# Patient Record
Sex: MALE | Race: WHITE
[De-identification: names, ages, dates, MRNs, and addresses within clinical notes are randomized per-mention and may not be internally consistent; named-entity substitution may affect disease eponyms.]

---

## 2019-04-04 ENCOUNTER — HOSPITAL ENCOUNTER (INPATIENT)
Dept: HOSPITAL 97 - ER | Age: 74
LOS: 1 days | Discharge: ANOTHER HOSPITAL | DRG: 282 | End: 2019-04-05
Attending: INTERNAL MEDICINE | Admitting: INTERNAL MEDICINE
Payer: COMMERCIAL

## 2019-04-04 DIAGNOSIS — E78.5: ICD-10-CM

## 2019-04-04 DIAGNOSIS — Z79.4: ICD-10-CM

## 2019-04-04 DIAGNOSIS — M19.90: ICD-10-CM

## 2019-04-04 DIAGNOSIS — E11.9: ICD-10-CM

## 2019-04-04 DIAGNOSIS — I25.10: ICD-10-CM

## 2019-04-04 DIAGNOSIS — I21.4: Primary | ICD-10-CM

## 2019-04-04 DIAGNOSIS — Z95.5: ICD-10-CM

## 2019-04-04 LAB
BUN BLD-MCNC: 35 MG/DL (ref 7–18)
GLUCOSE SERPLBLD-MCNC: 166 MG/DL (ref 74–106)
HCT VFR BLD CALC: 51.9 % (ref 39.6–49)
LYMPHOCYTES # SPEC AUTO: 0.9 K/UL (ref 0.7–4.9)
PMV BLD: 9.6 FL (ref 7.6–11.3)
POTASSIUM SERPL-SCNC: 5 MMOL/L (ref 3.5–5.1)
RBC # BLD: 5.12 M/UL (ref 4.33–5.43)
TROPONIN (EMERG DEPT USE ONLY): 0.13 NG/ML (ref 0–0.04)
UA COMPLETE W REFLEX CULTURE PNL UR: (no result)
UA DIPSTICK W REFLEX MICRO PNL UR: (no result)
URINE COARSE GRANULAR CASTS: (no result) /LPF

## 2019-04-04 PROCEDURE — 93458 L HRT ARTERY/VENTRICLE ANGIO: CPT

## 2019-04-04 PROCEDURE — 36415 COLL VENOUS BLD VENIPUNCTURE: CPT

## 2019-04-04 PROCEDURE — 85025 COMPLETE CBC W/AUTO DIFF WBC: CPT

## 2019-04-04 PROCEDURE — 81003 URINALYSIS AUTO W/O SCOPE: CPT

## 2019-04-04 PROCEDURE — 84484 ASSAY OF TROPONIN QUANT: CPT

## 2019-04-04 PROCEDURE — 71045 X-RAY EXAM CHEST 1 VIEW: CPT

## 2019-04-04 PROCEDURE — 96365 THER/PROPH/DIAG IV INF INIT: CPT

## 2019-04-04 PROCEDURE — 99284 EMERGENCY DEPT VISIT MOD MDM: CPT

## 2019-04-04 PROCEDURE — 87086 URINE CULTURE/COLONY COUNT: CPT

## 2019-04-04 PROCEDURE — 93005 ELECTROCARDIOGRAM TRACING: CPT

## 2019-04-04 PROCEDURE — 83880 ASSAY OF NATRIURETIC PEPTIDE: CPT

## 2019-04-04 PROCEDURE — 87088 URINE BACTERIA CULTURE: CPT

## 2019-04-04 PROCEDURE — 93306 TTE W/DOPPLER COMPLETE: CPT

## 2019-04-04 PROCEDURE — 80048 BASIC METABOLIC PNL TOTAL CA: CPT

## 2019-04-04 PROCEDURE — 81015 MICROSCOPIC EXAM OF URINE: CPT

## 2019-04-04 PROCEDURE — 82962 GLUCOSE BLOOD TEST: CPT

## 2019-04-04 RX ADMIN — HUMAN INSULIN SCH: 100 INJECTION, SOLUTION SUBCUTANEOUS at 20:56

## 2019-04-04 RX ADMIN — ATORVASTATIN CALCIUM SCH MG: 10 TABLET, FILM COATED ORAL at 20:29

## 2019-04-04 RX ADMIN — Medication SCH ML: at 21:28

## 2019-04-04 NOTE — ER
Nurse's Notes                                                                                     

 Formerly [facility]                                                                 

Name: Laci Larson                                                                              

Age: 73 yrs                                                                                       

Sex: Male                                                                                         

: 1945                                                                                   

MRN: [id number]                                                                                   

Arrival Date: 2019                                                                          

Time: 13:04                                                                                       

Account#: [id number]                                                                            

Bed 4                                                                                             

Private MD:                                                                                       

Diagnosis: Pulmonary edema;Hypoxemia;Unspecified combined systolic (congestive) and diastolic     

  (congestive) heart failure                                                                      

                                                                                                  

Presentation:                                                                                     

                                                                                             

13:05 Presenting complaint: EMS states: from home, pt was seen on the floor when EMS arrived; hj  

      complaining of SOB; RA O2 sat- 87%; was initially placed on BIPAP but pt refused; so        

      was placed on CPAP with A/A treatment x 1 PTA; BP- 97/48; just visited his cardiologist     

      today; A\T\O x 4 on triage;. Transition of care: patient was not received from another      

      setting of care. Onset of symptoms was 2019. Risk Assessment: Do you want to      

      hurt yourself or someone else? Patient reports no desire to harm self or others.            

      Initial Sepsis Screen: Does the patient meet any 2 criteria? Yes Does the patient have      

      a suspected source of infection? Yes:. Care prior to arrival: None.                         

13:05 Method Of Arrival: EMS: [facility]                                                        

13:05 Acuity: LADAN 2                                                                           hj  

                                                                                                  

Triage Assessment:                                                                                

13:11 General: Appears in no apparent distress. uncomfortable, Behavior is calm, cooperative, hj  

      appropriate for age. Respiratory: Reports shortness of breath labored breathing Onset:      

      The symptoms/episode began/occurred today, the patient has severe shortness of breath.      

                                                                                                  

Historical:                                                                                       

- Allergies:                                                                                      

13:10 No Known Allergies;                                                                     hj  

- Home Meds:                                                                                      

13:10 Novolin N 100 unit/mL Sub-Q susp twice a day [Active]; Plavix 75 mg Oral tab 1 tab once hj  

      daily [Active]; isosorbide mononitrate 60 mg Oral Tb24 1 tab once daily [Active];           

      metoprolol tartrate 50 mg Oral tab 25 mg 2 times per day [Active]; furosemide 40 mg         

      Oral tab 1 tab once daily [Active]; glimepiride 4 mg Oral tab 1 tab once daily              

      [Active]; aspirin 81 mg Oral chew 1 tab once daily [Active]; citalopram 20 mg tab 1 tab     

      once daily [Active]; Norco  mg Oral tab 1 tab twice a day [Active]; fentanyl 50       

      mcg/hr Topical pt72 1 patch every 72 hours [Active]; simvastatin 20 mg Oral tab 1 tab       

      once daily [Active]; lisinopril 5 mg Oral tab 1 tab once daily [Active];                    

- PMHx:                                                                                           

13:10 Diabetes - NIDDM;                                                                       hj  

13:10 Hypertension; Hyperlipidemia;                                                           hj  

- PSHx:                                                                                           

13:10 None;                                                                                   hj  

                                                                                                  

- Immunization history:: Adult Immunizations not immunized.                                       

- Social history:: Smoking status: Patient/guardian denies using tobacco,                         

  Patient/guardian denies using alcohol.                                                          

- Ebola Screening: : Patient negative for fever greater than or equal to 101.5 degrees            

  Fahrenheit, and additional compatible Ebola Virus Disease symptoms Patient denies               

  exposure to infectious person Patient denies travel to an Ebola-affected area in the            

  21 days before illness onset.                                                                   

- Family history:: not pertinent.                                                                 

- Hospitalizations: : No recent hospitalization is reported.                                      

                                                                                                  

                                                                                                  

Screenin:11 Abuse screen: Denies threats or abuse. Denies injuries from another. Nutritional        hj  

      screening: No deficits noted. Tuberculosis screening: No symptoms or risk factors           

      identified. Fall Risk Fall in past 12 months (25 points).                                   

                                                                                                  

Assessment:                                                                                       

13:11 Pain: Denies pain. Cardiovascular: Rhythm is regular. Respiratory: Airway is            hj  

      compromised Trachea midline Respiratory effort is labored, Respiratory pattern is           

      regular, tachypnea Breath sounds are diminished bilaterally.                                

13:11 General: Appears in no apparent distress. uncomfortable, Behavior is calm, cooperative, hj  

      appropriate for age. Neuro: Level of Consciousness is awake, alert, obeys commands,         

      Oriented to person, place, time, situation, Appropriate for age. GI: No signs and/or        

      symptoms were reported involving the gastrointestinal system. : No signs and/or           

      symptoms were reported regarding the genitourinary system. EENT: No signs and/or            

      symptoms were reported regarding the EENT system. Derm: No signs and/or symptoms            

      reported regarding the dermatologic system. Musculoskeletal: No signs and/or symptoms       

      reported regarding the musculoskeletal system.                                              

13:38 Reassessment: Patient and/or family updated on plan of care and expected duration. Pain hj  

      level reassessed. Patient is alert, oriented x 3, equal unlabored respirations, skin        

      warm/dry/pink. awaiting results and POC; provider in room with wife;.                       

13:48 Reassessment: Patient and/or family updated on plan of care and expected duration. Pain hj  

      level reassessed. Patient is alert, oriented x 3, equal unlabored respirations, skin        

      warm/dry/pink. Patient states feeling better. Patient states symptoms have improved.        

14:08 Reassessment: awaiting results;.                                                        hj  

14:59 Reassessment: Patient and/or family updated on plan of care and expected duration. Pain hj  

      level reassessed. Patient is alert, oriented x 3, equal unlabored respirations, skin        

      warm/dry/pink. awaiting room placement;.                                                    

                                                                                                  

Vital Signs:                                                                                      

13:12 BP 89 / 62; Pulse 74; Resp 24; Temp 97.4(O); Pulse Ox 97% on Nebulizer Mask; Weight     hj  

      120.2 kg; Height 6 ft. 3 in. (190.50 cm); Pain 0/10;                                        

13:34 BP 95 / 62; Pulse 76; Resp 20; Pulse Ox 96% on Nebulizer Mask;                          hj  

13:45  / 86; Pulse 88; Resp 18; Pulse Ox 98% on Nebulizer Mask;                         hj  

13:49 Pulse Ox 97% on 2 lpm NC;                                                               hj  

14:01 BP 79 / 89; Pulse 76; Resp 18; Pulse Ox 96% on 4 lpm NC;                                hj  

14:08 BP 90 / 57; Pulse 77; Resp 18; Pulse Ox 93% on 4 lpm NC;                                hj  

14:24  / 89; Pulse 73; Resp 18; Pulse Ox 93% on 4 lpm NC;                               hj  

14:43  / 66; Pulse 76; Resp 18; Pulse Ox 93% on 4 lpm NC;                               hj  

14:59  / 56; Pulse 76; Resp 18; Pulse Ox 94% on 4 lpm NC;                               hj  

13:12 Body Mass Index 33.12 (120.20 kg, 190.50 cm)                                            hj  

                                                                                                  

ED Course:                                                                                        

13:04 Patient arrived in ED.                                                                  ss  

13:05 Torres Gordon MD is Attending Physician.                                                rn  

13:05 Yaniv Black RN is Primary Nurse.                                                    hj  

13:07 Triage completed.                                                                       hj  

13:12 Arm band placed on right wrist.                                                         hj  

13:12 Patient has correct armband on for positive identification. Placed in gown. Bed in low  hj  

      position. Call light in reach. Side rails up X 1.                                           

13:23 Inserted saline lock: 20 gauge in left wrist, using aseptic technique.                  pc1 

13:23 EKG done, by EKG tech. reviewed by Torres Gordon MD.                                      sm3 

13:56 XRAY CXR (1 view) In Process Unspecified.                                               EDMS

14:35 Liu Cummins MD is Hospitalizing Provider.                                             rn  

                                                                                                  

Administered Medications:                                                                         

13:17 Drug: NS 0.9% 250 ml Route: IV; Rate: 1 bolus; Site: left wrist;                        hj  

14:18 Follow up: IV Status: Completed infusion                                                hj  

13:49 Drug: LaSIX 20 mg Route: PO;                                                            hj  

14:17 Follow up: Response: No adverse reaction                                                hj  

13:58 Drug: NS 0.9% 250 ml Route: IV; Rate: bolus; Site: left wrist;                          hj  

14:18 Follow up: IV Status: Completed infusion                                                hj  

14:17 Drug: NS 0.9% 250 ml Route: IV; Rate: bolus; Site: left wrist;                          hj  

14:34 Drug: Tylenol 650 mg Route: PO;                                                         hj  

14:45 Follow up: Response: No adverse reaction; Pain is decreased                             hj  

                                                                                                  

                                                                                                  

Outcome:                                                                                          

14:35 Decision to Hospitalize by Provider.                                                    rn  

15:52 Patient left the ED.                                                                    hj  

                                                                                                  

Signatures:                                                                                       

Dispatcher [employer]                           EDMS                                                 

Torres Gordon MD MD rn Smirch, Shelby, RN RN   ss                                                   

Yaniv Black RN RN                                                      

Tiffany Rocha                              sm3                                                  

Cantu, Patrick                               pc1                                                  

                                                                                                  

Corrections: (The following items were deleted from the chart)                                    

13:28 13:10 Home Meds: Unable to obtain; hj                                                   hj  

14:16 14:08 BP 90 / 57; Pulse 77bpm; Resp 18bpm; Pulse Ox 98% 4 lpm Nasal Cannula; hj         hj  

                                                                                                  

**************************************************************************************************

## 2019-04-04 NOTE — RAD REPORT
EXAM DESCRIPTION:  RAD - Chest Single View - 4/4/2019 1:55 pm

 

CLINICAL HISTORY:  Dyspnea, shortness of breath

 

COMPARISON:  March 20, 2019

 

TECHNIQUE:  AP portable chest image was obtained 1352 hours .

 

FINDINGS:  Lung volumes are low accentuating interstitial opacification. Cardiac silhouette is enlarg
ed, again accentuated by shallow inspiration. Vasculature and interstitial markings overall are promi
nent and favor a mild failure/ volume overload.

 

 No measurable pleural effusion and no pneumothorax. No acute bony abnormality seen. No acute aortic 
findings suspected.

 

IMPRESSION:  Mild CHF/volume overload pattern accentuated by body habitus, portable technique and sha
llow inspiration.

## 2019-04-04 NOTE — EDPHYS
Physician Documentation                                                                           

 [facility]                                                                 

Name: Laci Larson                                                                              

Age: 73 yrs                                                                                       

Sex: Male                                                                                         

: 1945                                                                                   

MRN: [id number]                                                                                   

Arrival Date: 2019                                                                          

Time: 13:04                                                                                       

Account#: [id number]                                                                            

Bed 4                                                                                             

Private MD:                                                                                       

ED Physician Torres Gordon                                                                         

HPI:                                                                                              

                                                                                             

13:17 This 73 yrs old  Male presents to ER via EMS with complaints of Shortness Of   rn  

      Breath.                                                                                     

13:17 The patient has shortness of breath at rest, with light activity. Onset: The            rn  

      symptoms/episode began/occurred at an unknown time. Duration: The symptoms are              

      continuous. The patient's shortness of breath is aggravated by exertion, light              

      activity, walking. Associated signs and symptoms: Pertinent positives: non-productive       

      cough, Pertinent negatives: chest pain, fever, hemoptysis, loss of consciousness.           

      Severity of symptoms: At their worst the symptoms were moderate in the emergency            

      department the symptoms have improved. The patient has experienced similar episodes in      

      the past. The patient has been recently seen by a physician:. EMS reports patient had       

      just gotten back from cardiology appt, fell to ground because sob and weakness, no LOC,     

      no injury from fall, called for lift assist, but EMS noted dyspnea and couldn't walk        

      back to house alone without rest and assistance. O2 sat was 87%, placed on CPAP after       

      couldn't tolerate BIPAP, feels a little better. .                                           

                                                                                                  

Historical:                                                                                       

- Allergies:                                                                                      

13:10 No Known Allergies;                                                                     hj  

- Home Meds:                                                                                      

13:10 Novolin N 100 unit/mL Sub-Q susp twice a day [Active]; Plavix 75 mg Oral tab 1 tab once hj  

      daily [Active]; isosorbide mononitrate 60 mg Oral Tb24 1 tab once daily [Active];           

      metoprolol tartrate 50 mg Oral tab 25 mg 2 times per day [Active]; furosemide 40 mg         

      Oral tab 1 tab once daily [Active]; glimepiride 4 mg Oral tab 1 tab once daily              

      [Active]; aspirin 81 mg Oral chew 1 tab once daily [Active]; citalopram 20 mg tab 1 tab     

      once daily [Active]; Norco  mg Oral tab 1 tab twice a day [Active]; fentanyl 50       

      mcg/hr Topical pt72 1 patch every 72 hours [Active]; simvastatin 20 mg Oral tab 1 tab       

      once daily [Active]; lisinopril 5 mg Oral tab 1 tab once daily [Active];                    

- PMHx:                                                                                           

13:10 Diabetes - NIDDM;                                                                       hj  

13:10 Hypertension; Hyperlipidemia;                                                           hj  

- PSHx:                                                                                           

13:10 None;                                                                                   hj  

                                                                                                  

- Immunization history:: Adult Immunizations not immunized.                                       

- Social history:: Smoking status: Patient/guardian denies using tobacco,                         

  Patient/guardian denies using alcohol.                                                          

- Ebola Screening: : Patient negative for fever greater than or equal to 101.5 degrees            

  Fahrenheit, and additional compatible Ebola Virus Disease symptoms Patient denies               

  exposure to infectious person Patient denies travel to an Ebola-affected area in the            

  21 days before illness onset.                                                                   

- Family history:: not pertinent.                                                                 

- Hospitalizations: : No recent hospitalization is reported.                                      

                                                                                                  

                                                                                                  

ROS:                                                                                              

13:17 Constitutional: Negative for fever, chills, and weight loss, Eyes: Negative for injury, rn  

      pain, redness, and discharge, Neck: Negative for injury, pain, and swelling,                

      Cardiovascular: Negative for chest pain, palpitations,+ mild swelling Respiratory: +        

      sob and cough Abdomen/GI: Negative for abdominal pain, nausea, vomiting, diarrhea, and      

      constipation, MS/Extremity: Negative for injury and deformity, Skin: Negative for           

      injury, rash, and discoloration, Neuro: + generalized weakness, no focal weakness or        

      paresthesias                                                                                

                                                                                                  

Exam:                                                                                             

13:14 ECG was reviewed by the Attending Physician.                                            rn  

13:17 Constitutional:  This is a well developed, well nourished patient who is awake, alert,  rn  

      on CPAP and requires assistance moving into bed from stretcher. Head/Face:                  

      Normocephalic, atraumatic. Eyes:  Pupils equal round and reactive to light,                 

      extra-ocular motions intact.  Lids and lashes normal.  Conjunctiva and sclera are           

      non-icteric and not injected.  Cornea within normal limits.  Periorbital areas with no      

      swelling, redness, or edema. ENT:  dry MM, no stridor Cardiovascular:  Regular rate and     

      rhythm.  No pulse deficits. Respiratory:  + mild tachypnea with diminished breath           

      sounds at bases. Abdomen/GI:  soft, non-tender MS/ Extremity:  Pulses equal, no             

      cyanosis.  Neurovascular intact.  Full, normal range of motion.  Equal circumference.       

      Neuro:  Awake and alert, GCS 15, oriented to person, place, time, and situation.            

      Cranial nerves II-XII grossly intact.  Motor strength 5/5 in all extremities.  Sensory      

      grossly intact.                                                                             

                                                                                                  

Vital Signs:                                                                                      

13:12 BP 89 / 62; Pulse 74; Resp 24; Temp 97.4(O); Pulse Ox 97% on Nebulizer Mask; Weight     hj  

      120.2 kg; Height 6 ft. 3 in. (190.50 cm); Pain 0/10;                                        

13:34 BP 95 / 62; Pulse 76; Resp 20; Pulse Ox 96% on Nebulizer Mask;                          hj  

13:45  / 86; Pulse 88; Resp 18; Pulse Ox 98% on Nebulizer Mask;                         hj  

13:49 Pulse Ox 97% on 2 lpm NC;                                                               hj  

14:01 BP 79 / 89; Pulse 76; Resp 18; Pulse Ox 96% on 4 lpm NC;                                hj  

14:08 BP 90 / 57; Pulse 77; Resp 18; Pulse Ox 93% on 4 lpm NC;                                hj  

14:24  / 89; Pulse 73; Resp 18; Pulse Ox 93% on 4 lpm NC;                               hj  

14:43  / 66; Pulse 76; Resp 18; Pulse Ox 93% on 4 lpm NC;                               hj  

14:59  / 56; Pulse 76; Resp 18; Pulse Ox 94% on 4 lpm NC;                               hj  

13:12 Body Mass Index 33.12 (120.20 kg, 190.50 cm)                                            hj  

                                                                                                  

MDM:                                                                                              

13:05 Patient medically screened.                                                             rn  

14:33 Differential diagnosis: Anemia CHF exacerbation, Chronic Obstructive Pulmonary Disease  rn  

      Myocardial Infarction pneumonia, pulmonary edema, reactive airway disease. Data             

      reviewed: vital signs, nurses notes, lab test result(s), EKG, radiologic studies, plain     

      films, and as a result, I will admit patient. Counseling: I had a detailed discussion       

      with the patient and/or guardian regarding: the historical points, exam findings, and       

      any diagnostic results supporting the discharge/admit diagnosis, lab results, radiology     

      results, the need for further work-up and treatment in the hospital. Response to            

      treatment: the patient's symptoms have mildly improved after treatment, and as a            

      result, I will admit patient. ED course: Pt with pulmonary edema, O2 87%, seen by Dr Arrington recently and ECHO performed but unable to walk, no oxygen at home, will admit        

      for further care. Improved BP with NS. .                                                    

                                                                                                  

                                                                                             

13:06 Order name: BMP; Complete Time: 13:46                                                   rn  

04/04                                                                                             

13:06 Order name: CBC with Diff; Complete Time: 13:46                                         rn  

                                                                                             

13:06 Order name: XRAY CXR (1 view); Complete Time: 14:27                                     rn  

                                                                                             

13:06 Order name: NT PRO-BNP; Complete Time: 13:46                                            rn  

                                                                                             

13:06 Order name: Troponin (emerg Dept Use Only); Complete Time: 13:46                        rn  

                                                                                             

13:06 Order name: EKG; Complete Time: 13:06                                                   rn  

                                                                                             

13:06 Order name: Cardiac monitoring; Complete Time: 13:10                                    rn  

                                                                                             

13:06 Order name: EKG - Nurse/Tech; Complete Time: 13:10                                      rn  

                                                                                             

13:06 Order name: IV Saline Lock; Complete Time: 13:10                                        rn  

                                                                                             

13:06 Order name: Labs collected and sent; Complete Time: 13:23                               rn  

                                                                                             

13:06 Order name: O2 Per Protocol; Complete Time: 13:10                                       rn  

                                                                                             

13:06 Order name: O2 Sat Monitoring; Complete Time: 13:10                                     rn  

                                                                                                  

EC:14 Rate is 76 beats/min. Rhythm is regular. QRS interval is normal. QT interval is         rn  

      prolonged. No Q waves. T waves are Normal. No ST changes noted. Clinical impression:        

      NSR w/ Non-specific ST/T Changes. Interpreted by me.                                        

                                                                                                  

Administered Medications:                                                                         

13:17 Drug: NS 0.9% 250 ml Route: IV; Rate: 1 bolus; Site: left wrist;                        hj  

14:18 Follow up: IV Status: Completed infusion                                                hj  

13:49 Drug: LaSIX 20 mg Route: PO;                                                            hj  

14:17 Follow up: Response: No adverse reaction                                                hj  

13:58 Drug: NS 0.9% 250 ml Route: IV; Rate: bolus; Site: left wrist;                          hj  

14:18 Follow up: IV Status: Completed infusion                                                hj  

14:17 Drug: NS 0.9% 250 ml Route: IV; Rate: bolus; Site: left wrist;                          hj  

14:34 Drug: Tylenol 650 mg Route: PO;                                                         hj  

14:45 Follow up: Response: No adverse reaction; Pain is decreased                             hj  

                                                                                                  

                                                                                                  

Disposition:                                                                                      

19 14:35 Hospitalization ordered by Liu Cummins for Inpatient Admission. Preliminary       

  diagnosis are Pulmonary edema, Hypoxemia, Unspecified combined systolic                         

  (congestive) and diastolic (congestive) heart failure.                                          

- Bed requested for Telemetry/MedSurg (Inpatient).                                                

- Status is Inpatient Admission.                                                              hj  

- Condition is Stable.                                                                            

- Problem is an ongoing problem.                                                                  

- Symptoms have improved.                                                                         

UTI on Admission? No                                                                              

                                                                                                  

                                                                                                  

                                                                                                  

Signatures:                                                                                       

Dispatcher [employer]                           EDTorres Larsen MD MD rn Joaquin, Henry, RN RN hj Botello, Elizabeth eb                                                   

                                                                                                  

Corrections: (The following items were deleted from the chart)                                    

13:28 13:10 Home Meds: Unable to obtain; hj                                                   hj  

15:02 14:35 Hospitalization Ordered by Liu Cummins MD for Inpatient Admission. Preliminary    eb  

      diagnosis is Pulmonary edema; Hypoxemia; Unspecified combined systolic (congestive) and     

      diastolic (congestive) heart failure. Bed requested for Telemetry/MedSurg (Inpatient).      

      Status is Inpatient Admission. Condition is Stable. Problem is an ongoing problem.          

      Symptoms have improved. UTI on Admission? No. rn                                            

15:52 15:02 2019 14:35 Hospitalization Ordered by Liu Cummins MD for Inpatient            

      Admission. Preliminary diagnosis is Pulmonary edema; Hypoxemia; Unspecified combined        

      systolic (congestive) and diastolic (congestive) heart failure. Bed requested for           

      Telemetry/MedSurg (Inpatient). Status is Inpatient Admission. Condition is Stable.          

      Problem is an ongoing problem. Symptoms have improved. UTI on Admission? No. eb             

                                                                                                  

**************************************************************************************************

## 2019-04-05 LAB
BUN BLD-MCNC: 35 MG/DL (ref 7–18)
GLUCOSE SERPLBLD-MCNC: 70 MG/DL (ref 74–106)
POTASSIUM SERPL-SCNC: 4.5 MMOL/L (ref 3.5–5.1)

## 2019-04-05 PROCEDURE — B211YZZ FLUOROSCOPY OF MULTIPLE CORONARY ARTERIES USING OTHER CONTRAST: ICD-10-PCS

## 2019-04-05 PROCEDURE — 4A023N7 MEASUREMENT OF CARDIAC SAMPLING AND PRESSURE, LEFT HEART, PERCUTANEOUS APPROACH: ICD-10-PCS

## 2019-04-05 RX ADMIN — HUMAN INSULIN SCH: 100 INJECTION, SOLUTION SUBCUTANEOUS at 16:30

## 2019-04-05 RX ADMIN — HUMAN INSULIN SCH: 100 INJECTION, SOLUTION SUBCUTANEOUS at 20:56

## 2019-04-05 RX ADMIN — ATORVASTATIN CALCIUM SCH MG: 10 TABLET, FILM COATED ORAL at 20:55

## 2019-04-05 RX ADMIN — HUMAN INSULIN SCH: 100 INJECTION, SOLUTION SUBCUTANEOUS at 11:30

## 2019-04-05 RX ADMIN — Medication SCH ML: at 09:12

## 2019-04-05 RX ADMIN — Medication SCH ML: at 20:56

## 2019-04-05 RX ADMIN — HUMAN INSULIN SCH: 100 INJECTION, SOLUTION SUBCUTANEOUS at 07:30

## 2019-04-05 NOTE — CON
History Of Present Illness:  Mr. Larson is 73, he came to the hospital because he was trying to get
 himself into a pickup truck, struggled, got weak and fell to the ground.  EMS was called.  He was br
ought here.  Denies having any chest pain, but his x-ray showed possible pulmonary edema, although it
 noted there is a possibility that it is under-penetration, poor inspiratory effort and a portable te
chnique that made it look like that Mr. Larson has a history of CAD.  He had a cardiac cath and zachary
nt in 2006.  Since then, nobody has looked at his heart.  He has diabetes, obesity, renal insufficien
cy, hypertension, dyslipidemia.  Outpatient, he takes simvastatin, lisinopril, Lasix, Plavix, aspirin
, fentanyl, insulin, hydrocodone, citalopram, glimepiride, metoprolol, isosorbide mononitrate.  I saw
 him 2 days ago in my office and stopped one of his medicines Cardizem because of symptoms that sound
 like heart failure and symptoms of dizziness with demonstrated hypotension.  His blood pressure has 
been better since we stopped cardia, so I continue to think it was a correct thing to do to stop card
ia.  We need to do an echo, need to do a cardiac cath.  He will be at risk of developing worsening re
nal function after the cardiac cath.  His creatinine is 1.98.  It is in this situation where unstable
 heart disease could be a more serious problem very quickly if we do not do everything that is possib
le to improve that.  So, I recommend a cardiac cath, possible stent.  We will do an echocardiogram to
 minimize the amount of contrast as much as possible.  The patient seems to understand the procedure,
 potential benefits, indications, risks, and agrees to proceed.



Physical Examination:

Lungs: His lungs are clear. 

Heart:  Reveals a regular rate and rhythm.  

Vital Signs:  Blood pressure is 108/55, heart rate 86, temperature 98.1.  



I would recommend that we do a cardiac cath later today.





JAYCEE/NVADEEP

DD:  04/05/2019 07:42:33Voice ID:  [id number]

DT:  04/05/2019 12:18:55Report ID:  [id number]

## 2019-04-05 NOTE — ECHO
HEIGHT: 6 ft 3 in   WEIGHT: 265 lb 0 oz   DATE OF STUDY: 04/05/2019   REFER DR: Mohinder Arrington MD

2-DIMENSIONAL: YES

     M.MODE: YES

 DOPPLER: YES

COLOR FLOW: YES



                    TDS:  NO

PORTABLE: NO

 DEFINITY:  NO

BUBBLE STUDY: NO





DIAGNOSIS:  EVALUATE LEFT VENTRICULAR EJECTION FRACTION



CARDIAC HISTORY:  

CATHERIZATION: YES

SURGERY: NO

PROSTHETIC VALVE: NO

PACEMAKER: NO





MEASUREMENTS (cm)

    DIASTOLIC (NORMALS)                 SYSTOLIC (NORMALS)

IVSd                 1.3 (0.6-1.2)                    LA Diam       (1.9-4.0)     LVEF     
    60-69%  

LVIDd               3.9 (3.5-5.7)                        LVIDs      3.0 (2.0-3.5)     %FS  
        23%

LVPWd             1.3 (0.6-1.2)

Ao Diam           3.4 (2.0-3.7)



2 DIMENSIONAL ASSESSMENT:

RIGHT ATRIUM:                   DILATED

LEFT ATRIUM:       DILATED



RIGHT VENTRICLE:            DILATED

LEFT VENTRICLE: LEFT VENTRICULAR HYPERTROPHY



TRICUSPID VALVE:             NORMAL

MITRAL VALVE:     NORMAL



PULMONIC VALVE:             NORMAL

AORTIC VALVE:     TRILEAFLET, MILD THICKENING



PERICARDIAL EFFUSION: NONE

AORTIC ROOT:      NORMAL





LEFT VENTRICULAR WALL MOTION:     NORMAL



DOPPLER/COLOR FLOW:     MILD TRICUSPID REGURGITATION. MILD PULMONARY HYPERTENSION. 
ESTIMATED RIGHT VENTRICULAR SYSTOLIC PRESSURE 43 mmHg. 



COMMENTS:      NORMAL LEFT VENTRICULAR EJECTION FRACTION. LEFT VENTRICULAR HYPERTROPHY. 
DILATED LEFT ATRIUM, RIGHT ATRIUM AND RIGHT VENTRICLE. AORTIC SCLEROSIS WITH NO AORTIC 
STENOSIS OR AORTIC REGURGITATION. 



TECHNOLOGIST:   MONO GIRALDO

## 2019-04-05 NOTE — OP
Surgeon:  Mohinder Arrington MD



Procedures:  Left heart catheterization, coronary angiography.  No LV gram was done.



Findings:  The patient has severe 3-vessel CAD and needs bypass surgery.  His right coronary artery h
as a 70% ostial stenosis in the midportion, sequential 90% stenosis.  There is an old stent in the re
gion and the ostium of the posterior descending artery has a 90% stenosis.  The left main is very irr
egular, free of any significant stenosis.  The circumflex is large, ectatic, irregular, but no signif
icant stenosis.  The ostium of the LAD has a 70% stenosis.  The mid LAD has a 70% stenosis.  The osti
um of the ramus intermedius artery, a very large artery, has an ostial 70% stenosis.  Left ventriculo
gram was not done, but we measured pressures and found the left ventricular end-diastolic pressure at
 0, no pullback gradient, normal pressures.



Procedure In Detail:  The patient was brought to the cardiac cath lab in a fasting state.  He had dedra
dence of a non-ST elevation MI associated with a fall.  Prepared and draped in usual sterile fashion,
 sedated with Versed and fentanyl.  Right radial approach was used.  Right radial artery was identifi
ed through palpation, anesthetized with 1% lidocaine, entered with a 21-gauge needle, cannulated with
 a 0.021 inch diameter guidewire.  A 6-Danish Terumo sheath was used.  As soon as the sheath was in p
lace, it was flushed and we gave the radial cocktail consisting of nicardipine, heparin, nitroglyceri
n.  The TIG catheter was guided into the ascending aorta and allowed us to measure LV pressures, estelle
ogram of the right coronary artery and left coronary artery.  At the end of the procedure, the cathet
er was removed, sheath was removed, and the arteriotomy was closed with a TR band.



Estimated Blood Loss:  5 cc.



Complications:  None.



First Assistant:  Kojo Hui.





PROSPER

DD:  04/05/2019 10:21:23Voice ID:  [id number]

DT:  04/05/2019 22:41:22Report ID:  [id number]

## 2019-04-06 NOTE — HP
Date of Admission:  04/04/2019



Chief Complaint:  Chest pain, shortness of breath.



History Of Present Illness:  A 73-year-old male was brought to the emergency room because of shortnes
s of breath and chest pain.  The patient's evaluation showed evidence of heart failure as well as triny
vated troponin.  The patient is admitted.  The patient denied any history of fever, chills, rigors.



Past Medical History:  Extensive, includes history of type 2 diabetes requiring insulin, hypertension
, history of osteoarthritis.



Surgical History:  Negative.



Home Medicines:  Please refer to the chart.



Review of Systems:

The patient denied any history of fever, chills, rigors.



Physical Examination:

General:  Revealed a 73-year-old male, not in acute distress. 

HEENT:  Negative. 

Neck:  Supple.  JVD positive. 

Chest:  Scattered wheezes bilaterally. 

Heart:  Irregularity noted. 

Abdomen:  Soft. 

Extremities:  Mild pedal edema.



Laboratory Data:  Troponin over 3.  White count, hemoglobin normal.  Chem profile; BUN 35, creatinine
 1.9.  Chest x-ray, pulmonary congestion.



Assessment:  

1.Nontransmural MI.

2.Congestive heart failure.

3.Type 2 diabetes.

4.Chronic renal failure.

5.Osteoarthritis.



Plan:  The patient is scheduled for cardiac cath today to see his coronary anatomy.  There is Cardiol
ogy consult.  Meanwhile, the patient will be put on insulin sliding scale.  His blood pressure is low
.  Some of his medications will be on hold.





MICHAEL/NAVDEEP

DD:  04/05/2019 12:33:44Voice ID:  [id number]

## 2019-04-09 NOTE — EKG
Test Date:    2019-04-04               Test Time:    13:06:48

Technician:   NATE                                     

                                                     

MEASUREMENT RESULTS:                                       

Intervals:                                           

Rate:         76                                     

DE:           206                                    

QRSD:         100                                    

QT:           460                                    

QTc:          517                                    

Axis:                                                

P:            79                                     

DE:           206                                    

QRS:          126                                    

T:            140                                    

                                                     

INTERPRETIVE STATEMENTS:                                       

                                                     

Normal sinus rhythm

Cannot rule out Anterior infarct, age undetermined

Prolonged QT

Abnormal ECG

Compared to ECG 02/18/2007 06:12:20

Prolonged QT interval now present

Sinus tachycardia no longer present

Ventricular premature complex(es) no longer present

Atrial premature complex(es) no longer present

Myocardial infarct finding still present



Electronically Signed On 04-04-19 16:26:11 CDT by Mohinder Arrington

## 2019-07-14 ENCOUNTER — HOSPITAL ENCOUNTER (INPATIENT)
Dept: HOSPITAL 97 - ER | Age: 74
LOS: 2 days | Discharge: HOME | DRG: 193 | End: 2019-07-16
Attending: INTERNAL MEDICINE | Admitting: INTERNAL MEDICINE
Payer: COMMERCIAL

## 2019-07-14 VITALS — BODY MASS INDEX: 32.3 KG/M2

## 2019-07-14 DIAGNOSIS — I25.2: ICD-10-CM

## 2019-07-14 DIAGNOSIS — Z79.82: ICD-10-CM

## 2019-07-14 DIAGNOSIS — F03.90: ICD-10-CM

## 2019-07-14 DIAGNOSIS — E11.42: ICD-10-CM

## 2019-07-14 DIAGNOSIS — Z79.4: ICD-10-CM

## 2019-07-14 DIAGNOSIS — K59.00: ICD-10-CM

## 2019-07-14 DIAGNOSIS — Z99.81: ICD-10-CM

## 2019-07-14 DIAGNOSIS — E78.5: ICD-10-CM

## 2019-07-14 DIAGNOSIS — G89.29: ICD-10-CM

## 2019-07-14 DIAGNOSIS — I11.0: ICD-10-CM

## 2019-07-14 DIAGNOSIS — I25.10: ICD-10-CM

## 2019-07-14 DIAGNOSIS — I71.4: ICD-10-CM

## 2019-07-14 DIAGNOSIS — M54.9: ICD-10-CM

## 2019-07-14 DIAGNOSIS — I50.23: ICD-10-CM

## 2019-07-14 DIAGNOSIS — J18.9: Primary | ICD-10-CM

## 2019-07-14 LAB
ALBUMIN SERPL BCP-MCNC: 3.1 G/DL (ref 3.4–5)
ALP SERPL-CCNC: 122 U/L (ref 45–117)
ALT SERPL W P-5'-P-CCNC: 25 U/L (ref 12–78)
AST SERPL W P-5'-P-CCNC: 20 U/L (ref 15–37)
BUN BLD-MCNC: 28 MG/DL (ref 7–18)
GLUCOSE SERPLBLD-MCNC: 221 MG/DL (ref 74–106)
HCT VFR BLD CALC: 48.9 % (ref 39.6–49)
INR BLD: 1.1
LIPASE SERPL-CCNC: 101 U/L (ref 73–393)
LYMPHOCYTES # SPEC AUTO: 1.4 K/UL (ref 0.7–4.9)
NT-PROBNP SERPL-MCNC: 5757 PG/ML (ref ?–450)
PMV BLD: 10 FL (ref 7.6–11.3)
POTASSIUM SERPL-SCNC: 4.6 MMOL/L (ref 3.5–5.1)
RBC # BLD: 4.93 M/UL (ref 4.33–5.43)
TROPONIN (EMERG DEPT USE ONLY): < 0.02 NG/ML (ref 0–0.04)

## 2019-07-14 PROCEDURE — 94760 N-INVAS EAR/PLS OXIMETRY 1: CPT

## 2019-07-14 PROCEDURE — 74176 CT ABD & PELVIS W/O CONTRAST: CPT

## 2019-07-14 PROCEDURE — 83690 ASSAY OF LIPASE: CPT

## 2019-07-14 PROCEDURE — 82962 GLUCOSE BLOOD TEST: CPT

## 2019-07-14 PROCEDURE — 93005 ELECTROCARDIOGRAM TRACING: CPT

## 2019-07-14 PROCEDURE — 80061 LIPID PANEL: CPT

## 2019-07-14 PROCEDURE — 71045 X-RAY EXAM CHEST 1 VIEW: CPT

## 2019-07-14 PROCEDURE — 82805 BLOOD GASES W/O2 SATURATION: CPT

## 2019-07-14 PROCEDURE — 87040 BLOOD CULTURE FOR BACTERIA: CPT

## 2019-07-14 PROCEDURE — 71046 X-RAY EXAM CHEST 2 VIEWS: CPT

## 2019-07-14 PROCEDURE — 99285 EMERGENCY DEPT VISIT HI MDM: CPT

## 2019-07-14 PROCEDURE — 80076 HEPATIC FUNCTION PANEL: CPT

## 2019-07-14 PROCEDURE — 80048 BASIC METABOLIC PNL TOTAL CA: CPT

## 2019-07-14 PROCEDURE — 85610 PROTHROMBIN TIME: CPT

## 2019-07-14 PROCEDURE — 85025 COMPLETE CBC W/AUTO DIFF WBC: CPT

## 2019-07-14 PROCEDURE — 93306 TTE W/DOPPLER COMPLETE: CPT

## 2019-07-14 PROCEDURE — 84484 ASSAY OF TROPONIN QUANT: CPT

## 2019-07-14 PROCEDURE — 36415 COLL VENOUS BLD VENIPUNCTURE: CPT

## 2019-07-14 PROCEDURE — 81003 URINALYSIS AUTO W/O SCOPE: CPT

## 2019-07-14 PROCEDURE — 83880 ASSAY OF NATRIURETIC PEPTIDE: CPT

## 2019-07-14 NOTE — EKG
Test Date:    2019-07-14               Test Time:    18:24:40

Technician:   TEVIN                                    

                                                     

MEASUREMENT RESULTS:                                       

Intervals:                                           

Rate:         115                                    

SD:           178                                    

QRSD:         98                                     

QT:           330                                    

QTc:          456                                    

Axis:                                                

P:            38                                     

SD:           178                                    

QRS:          120                                    

T:            -73                                    

                                                     

INTERPRETIVE STATEMENTS:                                       

                                                     

Sinus tachycardia

Possible Left atrial enlargement

Right axis deviation

ST & T wave abnormality, consider inferior ischemia

ST & T wave abnormality, consider anterolateral ischemia

Abnormal ECG

Compared to ECG 04/04/2019 13:06:48

Left posterior fascicular block now present

Sinus rhythm no longer present

Myocardial infarct finding no longer present

Prolonged QT interval no longer present



Electronically Signed On 07-14-19 20:07:58 CDT by Mohinder Arrington

## 2019-07-14 NOTE — ER
Nurse's Notes                                                                                     

 [facility]                                                                 

Name: Laci Larson                                                                              

Age: 74 yrs                                                                                       

Sex: Male                                                                                         

: 1945                                                                                   

MRN: [id number]                                                                                   

Arrival Date: 2019                                                                          

Time: 18:26                                                                                       

Account#: [id number]                                                                            

Bed 6                                                                                             

Private MD:                                                                                       

Diagnosis: Pneumonia                                                                              

                                                                                                  

Presentation:                                                                                     

                                                                                             

18:27 Presenting complaint: EMS states: abd pain and chest pain X 2 days, also c/o            iw  

      constipation, states he just doesn't feel good , rates pain 7/10. Transition of care:       

      patient was not received from another setting of care. Onset of symptoms was 2019. Risk Assessment: Do you want to hurt yourself or someone else? Patient reports no     

      desire to harm self or others. Initial Sepsis Screen: Does the patient meet any 2           

      criteria? HR > 90 bpm. Does the patient have a suspected source of infection? No.           

      Patient's initial sepsis screen is negative. Care prior to arrival: Medication(s)           

      given: ASA, 81 mg, x 3, Nitroglycerin, IV initiated. 20 GA, in the left wrist, Oxygen       

      administered. via nasal cannula.                                                            

18:27 Method Of Arrival: EMS                                                                  iw  

18:27 Method Of Arrival: EMS: [facility]                                                      iw  

18:27 Acuity: LADAN 2                                                                           iw  

                                                                                                  

Historical:                                                                                       

- Allergies:                                                                                      

18:39 No Known Allergies;                                                                     iw  

- Home Meds:                                                                                      

18:30 aspirin 81 mg Oral chew 1 tab once daily [Active]; citalopram 20 mg tab 1 tab once      iw  

      daily [Active]; fentanyl 50 mcg/hr Topical pt72 1 patch every 72 hours [Active];            

      glimepiride 4 mg Oral tab 1 tab once daily [Active]; isosorbide mononitrate 60 mg Oral      

      Tb24 1 tab once daily [Active]; metoprolol tartrate 50 mg Oral tab 25 mg 2 times per        

      day [Active]; Norco  mg Oral tab 1 tab twice a day [Active]; Plavix 75 mg Oral        

      tab 1 tab once daily [Active];                                                              

18:39 torsemide 20 mg oral tab 1 tab once daily [Active]; atorvastatin 80 mg oral tab 1 tab   iw  

      once daily [Active]; Novolin R Sub-Q 30 unit daily [Active];                                

- PMHx:                                                                                           

18:30 Diabetes - NIDDM; Hyperlipidemia; Hypertension;                                         iw  

- PSHx:                                                                                           

18:30 None;                                                                                   iw  

                                                                                                  

- Immunization history:: Adult Immunizations not up to date.                                      

- Social history:: Smoking status: Patient/guardian denies using tobacco.                         

- Ebola Screening: : Patient negative for fever greater than or equal to 101.5 degrees            

  Fahrenheit, and additional compatible Ebola Virus Disease symptoms Patient denies               

  exposure to infectious person Patient denies travel to an Ebola-affected area in the            

  21 days before illness onset No symptoms or risks identified at this time.                      

- Family history:: not pertinent.                                                                 

- Hospitalizations: : No recent hospitalization is reported.                                      

                                                                                                  

                                                                                                  

Screenin:34 Abuse screen: Denies threats or abuse. Denies injuries from another. Nutritional        iw  

      screening: No deficits noted. Tuberculosis screening: No symptoms or risk factors           

      identified. Fall Risk IV access (20 points).                                                

                                                                                                  

Assessment:                                                                                       

18:30 General: Appears in no apparent distress. uncomfortable, obese, well developed,         sv  

      Behavior is calm, cooperative, appropriate for age. Pain: Complains of pain in chest        

      and abdomen Pain currently is 7 out of 10 on a pain scale. Pain began 2-3 days ago. Is      

      intermittent. Neuro: Level of Consciousness is awake, alert, obeys commands, Oriented       

      to person, place, time, situation, Moves all extremities. Speech is normal.                 

      Cardiovascular: Patient's skin is warm and dry. Pulses are 3+ in right radial artery        

      and left radial artery. Respiratory: Reports shortness of breath Airway is patent           

      Respiratory effort is even, unlabored, Respiratory pattern is symmetrical, tachypnea.       

      GI: Abdomen is round obese, Abd is soft and non tender X 4 quads. Reports constipation.     

      Derm: Skin is thin, with poor turgor Skin is normal. Musculoskeletal: Range of motion:      

      intact in all extremities.                                                                  

19:45 Reassessment: Patient request to sit up in chair; Uncomfortable with being in stretcher lp1 

      and requesting to have venturi mask removed; Patient transferred to wheelchair and          

      nasal canula at 3L for comfort.                                                             

                                                                                                  

Vital Signs:                                                                                      

18:29  / 68; Pulse 105; Resp 20; Temp 99.1(TE); Pulse Ox 82% on R/A; Weight 120.2 kg;   iw  

      Height 6 ft. 3 in. (190.50 cm); Pain 7/10;                                                  

18:30 Pulse Ox 82% on R/A;                                                                    sv  

19:45  / 64; Pulse 102; Resp 16; Pulse Ox 94% on 3 lpm NC;                              lp1 

20:30  / 91; Pulse 102; Resp 20; Pulse Ox 94% on 3 lpm NC;                              lp1 

21:46  / 62; Pulse 109; Resp 17 S; Temp 98.3(O); Pulse Ox 93% on 3 lpm NC;              jd3 

18:29 Body Mass Index 33.12 (120.20 kg, 190.50 cm)                                            iw  

18:30 Pt placed on O2 \T\ 3L per NC, O2 sat up to 88%. Pt placed on 50% Venturi mask, O2 sat up sv  

      to 95%.                                                                                     

                                                                                                  

ED Course:                                                                                        

18:26 Patient arrived in ED.                                                                  iw  

18:27 Torres Gordon MD is Attending Physician.                                                rn  

18:29 Triage completed.                                                                       iw  

18:30 Patient has correct armband on for positive identification. Bed in low position. Call   sv  

      light in reach. Side rails up X2. Cardiac monitor on. Pulse ox on. NIBP on. Door            

      closed. Head of bed elevated.                                                               

18:30 Initial lab(s) drawn, by me, sent to lab. First set of blood cultures drawn by me.      sv  

      Inserted saline lock: 20 gauge in right upper arm, using aseptic technique. Blood           

      collected. Flushed right with 5 ml normal saline.                                           

18:30 Maintain EMS IV. Dressing intact. Site clean \T\ dry. Gauge \T\ site: 20G Left FA.          sv

18:34 Arm band placed on.                                                                     iw  

18:45 Second set of blood cultures drawn by me.                                               sv  

18:55 CT completed. Patient tolerated procedure well. Patient moved back from CT.             mw3 

18:56 Dior Zhong RN is Primary Nurse.                                                  sv  

18:56 Basic Metabolic Panel Sent.                                                             sv  

18:57 CT Abd/Pelvis - Without Contrast In Process Unspecified.                                EDMS

19:01 Attending Physician role handed off by Torres Gordon MD                                 ps1 

19:01 Chester Love MD is Attending Physician.                                             ps1 

19:01 Patient moved back from CT.                                                             sv  

19:08 Report given to Adrian THAYER and Fabi THAYER.                                               sv  

19:15 Primary Nurse role handed off by Dior Zhong RN                                   sv  

19:35 XRAY Chest (1 view): cough In Process Unspecified.                                      EDMS

20:20 Liu Cummins MD is Hospitalizing Provider.                                             ps1 

20:28 Trotter, Fabi, RN is Primary Nurse.                                                       lp1 

20:30 No provider procedures requiring assistance completed. Patient admitted, IV remains in  lp1 

      place.                                                                                      

                                                                                                  

Administered Medications:                                                                         

21:04 Drug: Zosyn 3.375 grams Route: IVPB; Infused Over: 60 mins; Site: right upper arm;      jd3 

21:38 Follow up: IV Status: Completed infusion; IV Intake: 50ml                               lp1 

21:05 Drug: LevaQUIN 750 mg Volume: 150 ml; Route: IVPB; Infused Over: 90 mins; Site: left    jd3 

      wrist;                                                                                      

22:09 Follow up: IV Status: Infusion continued upon admission                                 lp1 

21:38 Drug: vancoMYCIN 500 mg Route: IVPB; Infused Over: 1 hrs; Site: right upper arm;        lp1 

22:09 Follow up: IV Status: Infusion continued upon admission                                 lp1 

                                                                                                  

                                                                                                  

Intake:                                                                                           

21:38 IV: 50ml; Total: 50ml.                                                                  lp1 

                                                                                                  

Outcome:                                                                                          

20:21 Decision to Hospitalize by Provider.                                                    ps1 

20:30 Condition: stable                                                                       lp1 

20:30 Instructed on the need for admit.                                                           

22:09 Admitted to Tele accompanied by tech, family with patient, via wheelchair, room 424,    lp1 

      with oxygen, with chart, Report called to  JEOVANY Goodwin                                        

22:28 Patient left the ED.                                                                    aa1 

                                                                                                  

Signatures:                                                                                       

Dispatcher [employer]                           EDMS                                                 

Dior Zhong RN RN sv Autenrieth, Alissa, RN                  RN   aa1                                                  

Cheryl Berrios RN RN iw Nieto, Roman, MD MD rn Pena, Laura, RN RN   lp1                                                  

Adrian Serrano RN RN jd3 Singer, Phillip, MD MD ps1 Willis, Michelle                             3                                                  

                                                                                                  

Corrections: (The following items were deleted from the chart)                                    

18:34 18:29  / 68; Pulse 105bpm; Resp 20bpm; Temp 99.1F Temporal; Pain 7/10;          iw  

18:39 18:30 Home Meds: furosemide 40 mg Oral tab 1 tab once daily;                            

18:39 18:30 Home Meds: lisinopril 5 mg Oral tab 1 tab once daily;                             

18:39 18:30 Home Meds: Novolin N 100 unit/mL Sub-Q susp twice a day;                          

18:39 18:30 Home Meds: simvastatin 20 mg Oral tab 1 tab once daily;                           

18:39 18:31 Allergies: No Known Allergies;                                                  iw  

21:54 21:46  / 62; Pulse 109bpm; Resp 17bpm; Spontaneous; Pulse Ox 93% 3 lpm Nasal      jd3 

      Cannula; jd3                                                                                

22:10 22:09 Admitted to Tele accompanied by tech, family with patient, via wheelchair, with   lp1 

      oxygen, with chart, Report called to JEOVANY Goodwin lp1                                          

                                                                                                  

**************************************************************************************************

## 2019-07-14 NOTE — XMS REPORT
Clinical Summary

 Created on:2019



Patient:Nidhi Larson

Sex:Male

:1945

External Reference #:[id number]





Demographics







 Address  [address] [place] [address]

 

 Home Phone  [phone]

 

 Email Address  [email or web address]

 

 Preferred Language  English

 

 Marital Status  

 

 Religion Affiliation  Unknown

 

 Race  White

 

 Ethnic Group  Not  or 









Author







 Organization  [facility]

 

 Address  [address] [place] [address]









Support







 Name  Relationship  Address  Phone

 

 CELSO LARSON  Spouse  Unavailable  [phone]









Care Team Providers







 Name  Role  Phone

 

 Asked, No Pcp  Primary Care Provider  Unavailable









Allergies

No Known Allergies



Medications







 Medication  Sig  Dispensed  Refills  Start Date  End Date  Status

 

 HYDROcodone-acetami  Take 1 tablet    0      Active



 nophen (NORCO)  by mouth 2          



  mg per  (two) times a          



 tablet  day.          

 

 aspirin (ECOTRIN)  Take 81 mg by    0      Active



 81 MG enteric  mouth daily.          



 coated tablet            

 

 citalopram (CeleXA)  Take 20 mg by    0      Active



 20 MG tablet  mouth daily.          

 

 fentaNYL  Place 1 patch    0      Active



 (DURAGESIC) 25  on the skin          



 mcg/hr  every third          



   day.          

 

 glimepiride  Take 4 mg by    0      Active



 (AMARYL) 4 MG  mouth 2 (two)          



 tablet  times a day.          

 

 insulin NPH  Inject 40    0      Active



 (HumuLIN-N) 100  Units under          



 unit/mL injection  the skin daily          



   before dinner.          

 

 isosorbide  Take 60 mg by    0      Active



 mononitrate (IMDUR)  mouth daily.          



 60 MG 24 hr tablet            

 

 clopidogrel  Take 75 mg by    0      Active



 (PLAVIX) 75 mg  mouth daily.          



 tablet            

 

 metoprolol  Take 0.5  30 tablet  2019  Active



 succinate XL  tablets (12.5          



 (TOPROL-XL) 25 mg  mg total) by          



 24 hr tablet  mouth 2 (two)          



   times a day.          

 

 atorvastatin  Take 1 tablet  30 tablet  2019  Active



 (LIPITOR) 80 MG  (80 mg total)          



 tablet  by mouth          



   nightly.          

 

 furosemide (LASIX)  Take 40 mg by    0    2019  Discontinued



 40 mg tablet  mouth 2 (two)          



   times a day.          

 

 lisinopril  Take 5 mg by    0    2019  Discontinued



 (PRINIVIL,ZESTRIL)  mouth nightly.          



 5 mg tablet            

 

 simvastatin (ZOCOR)  Take 20 mg by    0    2019  Discontinued



 20 MG tablet  mouth nightly.          

 

 metoprolol  Take 25 mg by    0    2019  Discontinued



 succinate XL  mouth 2 (two)          



 (TOPROL-XL) 25 mg  times a day.          



 24 hr tablet            

 

 atorvastatin  Take 1 tablet  30 tablet  11  2019  
Discontinued



 (LIPITOR) 80 MG  (80 mg total)          



 tablet  by mouth          



   nightly.          

 

 metoprolol  Take 0.5  30 tablet  11  2019  Discontinued



 succinate XL  tablets (12.5          



 (TOPROL-XL) 25 mg  mg total) by          



 24 hr tablet  mouth 2 (two)          



   times a day.          

 

 torsemide (DEMADEX)  Take 1 tablet  28 tablet  0  2019  
Discontinued



 20 MG tablet  (20 mg total)          



   by mouth 2          



   (two) times a          



   day for 14          



   days.          

 

 torsemide (DEMADEX)  Take 1 tablet  28 tablet  0  2019  




 20 MG tablet  (20 mg total)          



   by mouth 2          



   (two) times a          



   day for 14          



   days.          







Active Problems







 Problem  Noted Date

 

 Coronary artery disease  2019

 

 Type 2 diabetes mellitus with nephropathy  2019

 

 Obesity due to excess calories  2019







Encounters







 Date  Type  Specialty  Care Team  Description

 

 2019  Telephone  Cardiovascular  Celine Kapoor  

 

 2019  Telephone  Cardiovascular  Celine Kapoor  

 

 05/10/2019  Telephone  Cardiovascular  Celine Kapoor  

 

 2019  Telephone  Cardiovascular  Josue Hemphill MD  

 

 2019  Telephone  Cardiovascular  Celine Kapoor  

 

 2019 -  Hospital  General Internal  Joby,  Coronary artery



 2019  Encounter  Medicine  Josue Osei,  disease of native



       MD  artery of native



         heart with stable



         angina pectoris



         (HCC) (Primary Dx)

 

 2019  Intake  Access    N/A



after 2018



Social History







 Tobacco Use  Types  Packs/Day  Years Used  Date

 

 Light Tobacco Smoker        









 Smokeless Tobacco: Current User  Snuff    









 Alcohol Use  Drinks/Week  oz/Week  Comments

 

 No      









 Alcohol Habits  Answer  Date Recorded

 

 How often do you have a drink containing alcohol?  Never  2019

 

 How many drinks containing alcohol do you have on a typical  Not asked  



 day when you are drinking?    

 

 How often do you have six or more drinks on one occasion?  Not asked  









 Sex Assigned at Birth  Date Recorded

 

 Not on file  









 Job Start Date  Occupation  Industry

 

 Not on file  Not on file  Not on file









 Travel History  Travel Start  Travel End









 No recent travel history available.







Last Filed Vital Signs







 Vital Sign  Reading  Time Taken

 

 Blood Pressure  100/58  2019  7:45 PM CDT

 

 Pulse  91  2019  7:45 PM CDT

 

 Temperature  36.3 C (97.3 F)  2019  7:45 PM CDT

 

 Respiratory Rate  18  2019  7:45 PM CDT

 

 Oxygen Saturation  98%  2019  7:45 PM CDT

 

 Inhaled Oxygen Concentration  -  -

 

 Weight  126 kg (278 lb 11.2 oz)  2019  5:00 AM CDT

 

 Height  -  -

 

 Body Mass Index  -  -







Plan of Treatment







 Health Maintenance  Due Date  Last Done  Comments

 

 DIABETIC RETINAL EYE EXAM  1945    

 

 DIABETIC FOOT EXAM  1953    

 

 URINE MICROALBUMIN  1953    

 

 COLONOSCOPY SCREENING  1993    

 

 SHINGLES VACCINES (#1)  1993    

 

 65+ PNEUMOCOCCAL VACCINE (1 of 2 - PCV13)  2008    

 

 INFLUENZA VACCINE  2019    







Procedures







 Procedure Name  Priority  Date/Time  Associated  Comments



       Diagnosis  

 

 POC GLUCOSE  Routine  2019  5:26    Results for this



     PM CDT    procedure are in



         the results



         section.

 

 POC GLUCOSE  Routine  2019 11:46    Results for this



     AM CDT    procedure are in



         the results



         section.

 

 POC GLUCOSE  Routine  2019  7:47    Results for this



     AM CDT    procedure are in



         the results



         section.

 

 POC GLUCOSE  Routine  2019  8:54    Results for this



     PM CDT    procedure are in



         the results



         section.

 

 POC GLUCOSE  Routine  2019  5:30    Results for this



     PM CDT    procedure are in



         the results



         section.

 

 POC GLUCOSE  Routine  2019 11:37    Results for this



     AM CDT    procedure are in



         the results



         section.

 

 POC GLUCOSE  Routine  2019  7:18    Results for this



     AM CDT    procedure are in



         the results



         section.

 

 POC GLUCOSE  Routine  2019  8:53    Results for this



     PM CDT    procedure are in



         the results



         section.

 

 POC GLUCOSE  Routine  2019  5:16    Results for this



     PM CDT    procedure are in



         the results



         section.

 

 POC GLUCOSE  Routine  2019 12:15    Results for this



     PM CDT    procedure are in



         the results



         section.

 

 XR CHEST 1 VW PORTABLE  STAT  2019 11:11    Results for this



     AM CDT    procedure are in



         the results



         section.

 

 ESTIMATED GFR  Routine  2019 10:06    Results for this



     AM CDT    procedure are in



         the results



         section.

 

 BASIC METABOLIC PANEL  Routine  2019 10:06    Results for this



     AM CDT    procedure are in



         the results



         section.

 

 POC GLUCOSE  Routine  2019  7:44    Results for this



     AM CDT    procedure are in



         the results



         section.

 

 POC GLUCOSE  Routine  04/10/2019  9:03    Results for this



     PM CDT    procedure are in



         the results



         section.

 

 POC GLUCOSE  Routine  04/10/2019  5:13    Results for this



     PM CDT    procedure are in



         the results



         section.

 

 POC GLUCOSE  Routine  04/10/2019 11:50    Results for this



     AM CDT    procedure are in



         the results



         section.

 

 POC GLUCOSE  Routine  04/10/2019  7:46    Results for this



     AM CDT    procedure are in



         the results



         section.

 

 ESTIMATED GFR  Routine  04/10/2019  4:14    Results for this



     AM CDT    procedure are in



         the results



         section.

 

 BASIC METABOLIC PANEL  Routine  04/10/2019  4:14    Results for this



     AM CDT    procedure are in



         the results



         section.

 

 HC COMPLETE BLD COUNT  Routine  04/10/2019  4:00    Results for this



 W/AUTO DIFF    AM CDT    procedure are in



         the results



         section.

 

 POC GLUCOSE  Routine  2019  8:49    Results for this



     PM CDT    procedure are in



         the results



         section.

 

 POC GLUCOSE  Routine  2019  5:10    Results for this



     PM CDT    procedure are in



         the results



         section.

 

 POC GLUCOSE  Routine  2019  1:06    Results for this



     PM CDT    procedure are in



         the results



         section.

 

 US ABDOMINAL WITH LIVER  Routine  2019  9:17    Results for this



 ELASTOGRAPHY    AM CDT    procedure are in



         the results



         section.

 

 POC GLUCOSE  Routine  2019  7:45    Results for this



     AM CDT    procedure are in



         the results



         section.

 

 HC COMPLETE BLD COUNT  Routine  2019  4:23    Results for this



 W/AUTO DIFF    AM CDT    procedure are in



         the results



         section.

 

 SEDIMENTATION RATE  Routine  2019  4:23    Results for this



     AM CDT    procedure are in



         the results



         section.

 

 ESTIMATED GFR  Routine  2019  4:00    Results for this



     AM CDT    procedure are in



         the results



         section.

 

 IONIZED CALCIUM  Routine  2019  4:00    Results for this



     AM CDT    procedure are in



         the results



         section.

 

 PHOSPHORUS LEVEL  Routine  2019  4:00    Results for this



     AM CDT    procedure are in



         the results



         section.

 

 MAGNESIUM LEVEL  Routine  2019  4:00    Results for this



     AM CDT    procedure are in



         the results



         section.

 

 COMPREHENSIVE METABOLIC  Routine  2019  4:00    Results for this



 PANEL    AM CDT    procedure are in



         the results



         section.

 

 US VEIN MAPPING LOWER  Routine  2019 10:10    Results for this



 EXTREMITY BILATERAL    PM CDT    procedure are in



         the results



         section.

 

 POC GLUCOSE  Routine  2019  8:59    Results for this



     PM CDT    procedure are in



         the results



         section.

 

 NM LUNG VENTILATION  STAT  2019  6:48    Results for this



 PERFUSION    PM CDT    procedure are in



         the results



         section.

 

 SCL-70 ANTIBODY  Routine  2019  6:32    Results for this



     PM CDT    procedure are in



         the results



         section.

 

 SS-B ANTIBODY  Routine  2019  6:32    Results for this



     PM CDT    procedure are in



         the results



         section.

 

 SS-A ANTIBODY  Routine  2019  6:32    Results for this



     PM CDT    procedure are in



         the results



         section.

 

 HIV AG/AB COMBINATION  Routine  2019  6:31    Results for this



     PM CDT    procedure are in



         the results



         section.

 

 CRP HIGH SENSITIVITY  Routine  2019  6:31    Results for this



     PM CDT    procedure are in



         the results



         section.

 

 DORETHA  Routine  2019  6:31    Results for this



     PM CDT    procedure are in



         the results



         section.

 

 POC GLUCOSE  Routine  2019  5:49    Results for this



     PM CDT    procedure are in



         the results



         section.

 

 POC GLUCOSE  Routine  2019 11:54    Results for this



     AM CDT    procedure are in



         the results



         section.

 

 US CAROTID DUPLEX  Routine  2019 11:00    Results for this



 BILATERAL    AM CDT    procedure are in



         the results



         section.

 

 POC GLUCOSE  Routine  2019  8:46    Results for this



     AM CDT    procedure are in



         the results



         section.

 

 POC GLUCOSE  Routine  2019  8:05    Results for this



     AM CDT    procedure are in



         the results



         section.

 

 ESTIMATED GFR  Routine  2019  4:00    Results for this



     AM CDT    procedure are in



         the results



         section.

 

 T4, FREE  Routine  2019  4:00    Results for this



     AM CDT    procedure are in



         the results



         section.

 

 THYROID STIMULATING  Routine  2019  4:00    Results for this



 HORMONE    AM CDT    procedure are in



         the results



         section.

 

 IONIZED CALCIUM  Routine  2019  4:00    Results for this



     AM CDT    procedure are in



         the results



         section.

 

 PHOSPHORUS LEVEL  Routine  2019  4:00    Results for this



     AM CDT    procedure are in



         the results



         section.

 

 MAGNESIUM LEVEL  Routine  2019  4:00    Results for this



     AM CDT    procedure are in



         the results



         section.

 

 COMPREHENSIVE METABOLIC  Routine  2019  4:00    Results for this



 PANEL    AM CDT    procedure are in



         the results



         section.

 

 HC COMPLETE BLD COUNT  Routine  2019  4:00    Results for this



 W/AUTO DIFF    AM CDT    procedure are in



         the results



         section.

 

 VITAMIN D 25 HYDROXY  Routine  2019 12:00    Results for this



 LEVEL    AM CDT    procedure are in



         the results



         section.

 

 POC GLUCOSE  Routine  2019  8:58    Results for this



     PM CDT    procedure are in



         the results



         section.

 

 XR KNEE 1 OR 2 VW RIGHT  STAT  2019  8:42    Results for this



     PM CDT    procedure are in



         the results



         section.

 

 XR HIP 2-3 VIEWS RIGHT  STAT  2019  8:41    Results for this



     PM CDT    procedure are in



         the results



         section.

 

 XR ELBOW 2 VW RIGHT  STAT  2019  8:41    Results for this



     PM CDT    procedure are in



         the results



         section.

 

 XR ANKLE 2 VW RIGHT  STAT  2019  8:41    Results for this



     PM CDT    procedure are in



         the results



         section.

 

 CT HEAD WO CONTRAST  STAT  2019  7:43    Results for this



     PM CDT    procedure are in



         the results



         section.

 

 POC GLUCOSE  Routine  2019  6:21    Results for this



     PM CDT    procedure are in



         the results



         section.

 

 POC GLUCOSE  Routine  2019  5:10    Results for this



     PM CDT    procedure are in



         the results



         section.

 

 ECHOCARDIOGRAM 2D  Routine  2019  1:58    Results for this



 COMPLETE W MMODE    PM CDT    procedure are in



 SPECTRAL COLOR DOPPLER        the results



 ([id number])        section.

 

 POC GLUCOSE  Routine  2019 11:39    Results for this



     AM CDT    procedure are in



         the results



         section.

 

 ESTIMATED GFR  Routine  2019 10:28    Results for this



     AM CDT    procedure are in



         the results



         section.

 

 COMPREHENSIVE METABOLIC  Routine  2019 10:28    Results for this



 PANEL    AM CDT    procedure are in



         the results



         section.

 

 POC GLUCOSE  Routine  2019  8:45    Results for this



     AM CDT    procedure are in



         the results



         section.

 

 B NATRIURETIC PEPTIDE  Routine  2019  4:15    Results for this



     AM CDT    procedure are in



         the results



         section.

 

 TROPONIN  Routine  2019  4:10    Results for this



     AM CDT    procedure are in



         the results



         section.

 

 ESTIMATED GFR  Routine  2019  4:10    Results for this



     AM CDT    procedure are in



         the results



         section.

 

 IONIZED CALCIUM  Routine  2019  4:10    Results for this



     AM CDT    procedure are in



         the results



         section.

 

 PHOSPHORUS LEVEL  Routine  2019  4:10    Results for this



     AM CDT    procedure are in



         the results



         section.

 

 MAGNESIUM LEVEL  Routine  2019  4:10    Results for this



     AM CDT    procedure are in



         the results



         section.

 

 COMPREHENSIVE METABOLIC  Routine  2019  4:10    Results for this



 PANEL    AM CDT    procedure are in



         the results



         section.

 

 HC COMPLETE BLD COUNT  Routine  2019  4:10    Results for this



 W/AUTO DIFF    AM CDT    procedure are in



         the results



         section.

 

 CREATININE LEVEL,  Routine  2019 11:30    Results for this



 URINE, RANDOM    PM CDT    procedure are in



         the results



         section.

 

 PROTEIN, URINE, RANDOM  Routine  2019 11:30    Results for this



     PM CDT    procedure are in



         the results



         section.

 

 URINALYSIS, AUTOMATED  Routine  2019 11:30    Results for this



 WITH MICROSCOPY    PM CDT    procedure are in



         the results



         section.

 

 POC GLUCOSE  Routine  2019  9:05    Results for this



     PM CDT    procedure are in



         the results



         section.

 

 POC GLUCOSE  Routine  2019  6:23    Results for this



     PM CDT    procedure are in



         the results



         section.

 

 POC GLUCOSE  Routine  2019 11:10    Results for this



     AM CDT    procedure are in



         the results



         section.

 

 POC GLUCOSE  Routine  2019  7:35    Results for this



     AM CDT    procedure are in



         the results



         section.

 

 XR CHEST 1 VW PORTABLE  STAT  2019  7:00    Results for this



     AM CDT    procedure are in



         the results



         section.

 

 HEMOGLOBIN A1C  Routine  2019  1:42    Results for this



     AM CDT    procedure are in



         the results



         section.

 

 HC COMPLETE BLD COUNT  Routine  2019  1:42    Results for this



 W/AUTO DIFF    AM CDT    procedure are in



         the results



         section.

 

 ECG 12-LEAD  STAT  2019 12:47    Results for this



     AM CDT    procedure are in



         the results



         section.

 

 LIPID PANEL  STAT  2019 12:40    Results for this



     AM CDT    procedure are in



         the results



         section.

 

 HEMOGLOBIN A1C  STAT  2019 12:40    Results for this



     AM CDT    procedure are in



         the results



         section.

 

 ESTIMATED GFR  STAT  2019 12:40    Results for this



     AM CDT    procedure are in



         the results



         section.

 

 B NATRIURETIC PEPTIDE  STAT  2019 12:40    Results for this



     AM CDT    procedure are in



         the results



         section.

 

 TYPE AND SCREEN  STAT  2019 12:40    Results for this



     AM CDT    procedure are in



         the results



         section.

 

 MAGNESIUM LEVEL  STAT  2019 12:40    Results for this



     AM CDT    procedure are in



         the results



         section.

 

 PARTIAL THROMBOPLASTIN  STAT  2019 12:40    Results for this



 TIME (PTT)    AM CDT    procedure are in



         the results



         section.

 

 PROTHROMBIN TIME WITH  STAT  2019 12:40    Results for this



 INR    AM CDT    procedure are in



         the results



         section.

 

 TROPONIN  STAT  2019 12:40    Results for this



     AM CDT    procedure are in



         the results



         section.

 

 PHOSPHORUS LEVEL  STAT  2019 12:40    Results for this



     AM CDT    procedure are in



         the results



         section.

 

 LACTIC ACID LEVEL  STAT  2019 12:40    Results for this



     AM CDT    procedure are in



         the results



         section.

 

 CREATINE KINASE, TOTAL  STAT  2019 12:40    Results for this



 (CPK)    AM CDT    procedure are in



         the results



         section.

 

 COMPREHENSIVE METABOLIC  STAT  2019 12:40    Results for this



 PANEL    AM CDT    procedure are in



         the results



         section.

 

 CBC WITH PLATELET AND  STAT  2019 12:40    Results for this



 DIFFERENTIAL    AM CDT    procedure are in



         the results



         section.

 

 POC GLUCOSE  Routine  2019 11:42    Results for this



     PM CDT    procedure are in



         the results



         section.



after 2018



Results

POC glucose (2019  5:26 PM CDT)Only the most recent of34 resultswithin 
the time period is included.





 Component  Value  Ref Range  Performed At  Pathologist Signature

 

 POC glucose  115 (H)  65 - 99 mg/dL  [facility]  



   Comment:    HOSPITAL  



   [facility] [place] Notified RN      



   Meter ID: [id number]      



   : Marshal Roca      



         









 Specimen

 

 









 Performing Organization  Address  City/State/Zipcode  Phone Number

 

 [facility] DEPARTMENT OF PATHOLOGY AND  [address] [place] [id number]  



 GENOMIC MEDICINE      

 

 [address] [address]  



XR Chest 1 Vw Portable (2019 11:11 AM CDT)Only the most recent of2 
resultswithin the time period is included.





 Specimen

 

 









 Narrative  Performed At

 

 EXAMINATION:XR CHEST 1 VW PORTABLE



   [facility]



  



  



 CLINICAL HISTORY:Dyspnea



  



  



  



 COMPARISON:Most recent [facility] prior.



  



  



  



 IMPRESSION:



  



  



  



 There is cardiomegaly and pulmonary vascular congestion.



  



  



  



 [facility]-[id number]



  



   









 Procedure Note

 

 Hm Interface, Radiology Results Incoming - 2019  2:06 PM CDT



EXAMINATION:  XR CHEST 1 VW PORTABLE



 



 CLINICAL HISTORY:  Dyspnea



 



 COMPARISON:  Most recent [facility] prior.



 



 IMPRESSION:



 



 There is cardiomegaly and pulmonary vascular congestion.



 



 [facility]-[id number]









 Performing Organization  Address  [facility]  Phone Number

 

  [facility]  [address] [place] [address]  



Estimated GFR (2019 10:06 AM CDT)Only the most recent of7 resultswithin 
the time period is included.





 Component  Value  Ref Range  Performed At  Pathologist



         Signature

 

 Estimated GFR  36 (A)  mL/min/1.73  [facility]  



   Comment:    HOSPITAL  



   CatergoryUnitsInterpretation      



   G1 >=90 Normal or high      



   G2 60-89Mildly decreased      



   [id number] to moderately decreased      



   [id number] to severely decreased      



   G4 15-29Severely decreased      



   G5 <15Kidney failure      



   The eGFR was calculated using the Chronic Kidney Disease      



   Epidemiology Collaboration (CKD-EPI) equation.      



   Interpretation is based on recommendations of the      



   National Kidney Foundation-Kidney Disease Outcomes Quality      



   Initiative (NKF-KDOQI) published in 2014.      



         









 Specimen

 

 Plasma specimen









 Performing Organization  Address  [facility]  Phone Number

 

 [facility] DEPARTMENT OF PATHOLOGY AND  [address] [place] [address]  



 GENOMIC MEDICINE      

 

 [facility]  [address] [place] [address]  



Basic metabolic panel (2019 10:06 AM CDT)Only the most recent of2 
resultswithin the time period is included.





 Component  Value  Ref Range  Performed At  Pathologist Signature

 

 Sodium  134 (L)  135 - 148 mEq/L  [facility]  

 

 Potassium  4.6  3.5 - 5.0 mEq/L  [facility]  

 

 Chloride  96 (L)  98 - 112 mEq/L  [facility]  

 

 CO2  23 (L)  24 - 31 mEq/L  [facility]  

 

 Anion gap  15@ANIO  7 - 15 mEq/L  [facility]  

 

 BUN  29 (H)  8 - 23 mg/dL  [facility]  

 

 Creatinine  1.81 (H)  0.70 - 1.20 mg/dL  [facility]  

 

 Glucose  200 (H)  65 - 99 mg/dL  [facility]  

 

 Calcium  7.7 (L)  8.8 - 10.2 mg/dL  [facility]  









 Specimen

 

 Plasma specimen









 Performing Organization  Address  [facility]  Phone Number

 

 [facility] DEPARTMENT OF PATHOLOGY AND  [address] [place] [address]  



 [address] [address]  



CBC with platelet and differential (04/10/2019  4:00 AM CDT)Only the most 
recent of6 resultswithin the time period is included.





 Component  Value  Ref Range  Performed At  Pathologist



         Signature

 

 WBC  4.73  4.50 - 11.00  [facility]  

 

 RBC  4.33 (L)  4.40 - 6.00  [facility]  

 

 HGB  14.1  14.0 - 18.0  [facility]  [facility]  

 

 HCT  43.4  41.0 - 51.0 %  [facility]  

 

 MCV  100.2 (H)  82.0 - 100.0  [facility]  

 

 MCH  32.6  27.0 - 34.0 pg  [facility]  

 

 MCHC  32.5  31.0 - 37.0  [facility]  

 

 RDW - SD  54.4  37.0 - 55.0 fL  [facility]  

 

 MPV  10.6  8.8 - 13.2 fL  [facility]  

 

 Platelet count  128 (L)  150 - 400 k/uL  [facility]  

 

 Nucleated RBC  0.00  /100 WBC  [facility]  

 

 Neutrophils  56.7  39.0 - 69.0 %  [facility]  

 

 Lymphocytes  26.6  25.0 - 45.0 %  [facility]  

 

 Monocytes  10.1 (H)  0.0 - 10.0 %  [facility]  

 

 Eosinophils  4.9  0.0 - 5.0 %  [facility]  

 

 Basophils  1.3 (H)  0.0 - 1.0 %  [facility]  

 

 Immature granulocytes  0.4Comment:  0.0 - 1.0 %  [facility]  



   "Immature    HOSPITAL  



   granulocytes"      



   (promyelocytes      



   , myelocytes,      



   metamyelocytes      



   )      









 Specimen

 

 Blood









 Performing Organization  Address  [facility]  Phone Number

 

 [facility] DEPARTMENT OF PATHOLOGY AND  [address] [place] [address]  



 [facility]  [address] [place] [address]  



US Abdominal with Liver Elastograpy (2019  9:17 AM CDT)





 Specimen

 

 









 Narrative  Performed At

 

 EXAM: US ABDOMINAL WITH LIVER ELASTOGRAPHY



  [facility]



  



  



 CLINICAL DATA:Eval echotexture look for cirrhosis



  



  



  



 COMPARISON: NONE.



  



  



  



 TECHNIQUE: Sonographic evaluation of the abdomen, including grayscale/B  



 mode, color and spectral Doppler as well as 2-D Shear wave elastography.



  



  



  



 FINDINGS:



  



  



  



 LIVER:The liver has a small right hepatic lobe with coarsened  



 echogenicity and a nodular contour.



  



  



  



 2D-Shear wave elastography was performed; 10 measurements were obtained  



 from the right hepatic lobe per protocol. The median stiffness was 4.79  



 kPa, within normal limits (see reference ranges for Aqua Accessiq E9 with  



 C1-6 Mhz probe below).



  



  



  



 ***Please note that velocities/stiffness may ALSO be elevated due to  



 passive hepatic congestion, biliary obstruction/cholestasis, and  



 inflammation (such as acute viral hepatitis flare).Results should be  



 interpreted with caution in these settings.



  



  



  



 GE Logiq E9 reference ranges:



  



  



  



 METAVIRSW velocity Stiffness



  



 Normal



  



 F>0<1.35 m/s



  



 1.35 m/s<5.48 kPa



  



 5.48 kPa



  



 F>11.66 m/s8.29 kPa



  



 F>21.77 m/s9.40 kPa



  



 F>31.99 m/s11.9 kPa



  



  



  



  



  



 Reliability:



  



 The IQR was 2.00 kPa, resulting in an IQR/median ratio of 0.418. (A  



 value of < 0.3 indicates a reliable dataset).



  



  



  



 MPV:Doppler evaluation of the portal vein demonstrates a grossly  



 patent vessel with hepatopedal flow. 



  



  



  



 GALLBLADDER:The gallbladder is without evidence of calculi. The  



 gallbladder wall is not thickened and there is no pericholecystic fluid.



  



  



  



 PANCREAS: Visualized portions of the pancreatic body are unremarkable.



  



  



  



 CBD: The common bile duct measures 3 mm , within normal limits.



  



  



  



 ASCITES: No abnormal abdominal fluid collections are visualized. There  



 is no evidence of ascites.



  



  



  



 KIDNEYS: The right kidney measures 10.2 cm in long axis diameter. There  



 is a 1.3 cm long axis hyperechoic exophytic focus extending from the  



 interpolar region, possibly a small angiomyolipoma and possibly  



 representing normal adjacent perirenal fat. There



  



  is also a 1.6 cm right interpolar cyst. The left kidney measures 10.1  



 cm.



  



  



  



 SPLEEN:The spleen is homogeneous and not enlarged measuring 11.9 cm.



  



  



  



 AORTA: The abdominal aorta was not visualized.



  



  



  



 IVC: Visualized portions of the IVC are patent.



  



  



  



 IMPRESSION: 



  



  



  



 Hepatic elastography suggests no significant fibrosis; however, the  



 IQR/median ratio was elevated, suggesting an unreliable data set.



  



  



  



 Macroscopically, the liver is heterogenous with a small right hepatic  



 lobe and a nodular contour, compatible with cirrhosis.



  



  



  



 Hyperechoic exophytic focus adjacent to the right interpolar region,  



 angiomyolipoma versus normal perirenal fat. Recommend comparison with  



 any prior available cross-sectional imaging and consider renal protocol  



 CT if further characterization is desired.



  



  



  



 [facility]-[id number]



  



   









 Procedure Note

 

 [facility], Radiology Results Incoming - 2019 11:08 AM CDT



EXAM: US ABDOMINAL WITH LIVER ELASTOGRAPHY



 



 CLINICAL DATA:  Eval echotexture look for cirrhosis



 



 COMPARISON: NONE.



 



 TECHNIQUE: Sonographic evaluation of the abdomen, including grayscale/B mode, 
color and spectral Doppler as well as 2-D Shear wave elastography.



 



 FINDINGS:



 



 LIVER:  The liver has a small right hepatic lobe with coarsened echogenicity 
and a nodular contour.



 



 2D-Shear wave elastography was performed; 10 measurements were obtained from 
the right hepatic lobe per protocol. The median stiffness was 4.79 kPa, within 
normal limits (see reference ranges for GE Logiq E9 with C1-6 Mhz probe below).



 



 ***Please note that velocities/stiffness may ALSO be elevated due to passive 
hepatic congestion, biliary obstruction/cholestasis, and inflammation (such as 
acute viral hepatitis flare).  Results should be interpreted with caution in 
these settings.



 



 GE Logiq E9 reference ranges:



 



 METAVIR  SW velocity   Stiffness



 Normal



 F>0  <1.35 m/s



 1.35 m/s  <5.48 kPa



 5.48 kPa



 F>1  1.66 m/s  8.29 kPa



 F>2  1.77 m/s  9.40 kPa



 F>3  1.99 m/s  11.9 kPa



 



 



 Reliability:



 The IQR was 2.00 kPa, resulting in an IQR/median ratio of 0.418. (A value of < 
0.3 indicates a reliable dataset).



 



 MPV:  Doppler evaluation of the portal vein demonstrates a grossly patent 
vessel with hepatopedal flow.



 



 GALLBLADDER:  The gallbladder is without evidence of calculi. The gallbladder 
wall is not thickened and there is no pericholecystic fluid.



 



 PANCREAS: Visualized portions of the pancreatic body are unremarkable.



 



 CBD: The common bile duct measures 3 mm , within normal limits.



 



 ASCITES: No abnormal abdominal fluid collections are visualized. There is no 
evidence of ascites.



 



 KIDNEYS: The right kidney measures 10.2 cm in long axis diameter. There is a 
1.3 cm long axis hyperechoic exophytic focus extending from the interpolar 
region, possibly a small angiomyolipoma and possibly



 representing normal adjacent perirenal fat. There



  is also a 1.6 cm right interpolar cyst. The left kidney measures 10.1 cm.



 



 SPLEEN:  The spleen is homogeneous and not enlarged measuring 11.9 cm.



 



 AORTA: The abdominal aorta was not visualized.



 



 IVC: Visualized portions of the IVC are patent.



 



 IMPRESSION:



 



 Hepatic elastography suggests no significant fibrosis; however, the IQR/median 
ratio was elevated, suggesting an unreliable data set.



 



 Macroscopically, the liver is heterogenous with a small right hepatic lobe and 
a nodular contour, compatible with cirrhosis.



 



 Hyperechoic exophytic focus adjacent to the right interpolar region, 
angiomyolipoma versus normal perirenal fat. Recommend comparison with any prior 
available cross-sectional imaging and consider renal protocol CT if



 further characterization is desired.



 



 [facility]-[id number]









 Performing Organization  Address  City/[facility]/Zipcode  Phone Number

 

 [address] [address]  



Sedimentation rate (2019  4:23 AM CDT)





 Component  Value  Ref Range  Performed At  Pathologist Signature

 

 Sedimentation rate  7  0 - 10 mm/hr  [facility]  









 Specimen

 

 Blood









 Performing Organization  Address  City[facility]  Phone Number

 

 [facility] DEPARTMENT OF PATHOLOGY AND  [address] [address]  



Phosphorus level (2019  4:00 AM CDT)Only the most recent of4 
resultswithin the time period is included.





 Component  Value  Ref Range  Performed At  Pathologist Signature

 

 Phosphorus  3.7  2.4 - 4.5 mg/dL  [facility]  









 Specimen

 

 Plasma specimen









 Performing Organization  Address  City/[facility]/Zipcode  Phone Number

 

 [facility] DEPARTMENT OF PATHOLOGY AND  [address] [address]  



Magnesium level (2019  4:00 AM CDT)Only the most recent of4 resultswithin 
the time period is included.





 Component  Value  Ref Range  Performed At  Pathologist Signature

 

 Magnesium  2.1  1.6 - 2.4 mg/dL  [facility]  









 Specimen

 

 Plasma specimen









 Performing Organization  Address  City/State/Zipcode  Phone Number

 

 [facility] DEPARTMENT OF PATHOLOGY AND  [address] [place] [address]  



 [facility]  [address] [place] [address]  



Ionized calcium (2019  4:00 AM CDT)Only the most recent of3 resultswithin 
the time period is included.





 Component  Value  Ref Range  Performed At  Pathologist Signature

 

 pH  7.68    [facility]  

 

 Ionized calcium  0.83 (L)  1.11 - 1.32  [facility]  



     mmol/L  [facility]  









 Specimen

 

 Plasma specimen









 Performing Organization  Address  City/State/Zipcode  Phone Number

 

 [facility] DEPARTMENT OF PATHOLOGY AND  [address] [place] [address]  



 [address] [address]  



Comprehensive metabolic panel (2019  4:00 AM CDT)Only the most recent of5 
resultswithin the time period is included.





 Component  Value  Ref Range  Performed At  Pathologist



         Signature

 

 Sodium  135  135 - 148  [facility]  



     mEq/L  [facility]  

 

 Potassium  4.6  3.5 - 5.0  [facility]  



     mEq/L  [facility]  

 

 Chloride  94 (L)  98 - 112 mEq/L  [facility]  

 

 CO2  24  24 - 31 mEq/L  [facility]  

 

 Anion gap  17@ANIO (H)  7 - 15 mEq/L  [facility]  

 

 BUN  31 (H)  8 - 23 mg/dL  [facility]  

 

 Creatinine  1.94 (H)  0.70 - 1.20  [facility]  



     mg/dL  [facility]  

 

 Glucose  126 (H)  65 - 99 mg/dL  [facility]  

 

 Calcium  8.4 (L)  8.8 - 10.2  [facility]  



     mg/dL  [facility]  

 

 Protein  7.5  6.3 - 8.3 g/dL  [facility]  



   Comment:    HOSPITAL  



    4.6-7.0 g/dL      



   1 week 4.4-7.6 g/dL      



   7 months-1year5.1-7.3 g/dL      



   1-2 years5.6-7.5 g/dL      



   >3 years6.0-8.0 g/dL      



    6.3-8.3 g/dL      



         

 

 Albumin  3.6  3.5 - 5.0 g/dL  [facility]  

 

 A/G ratio  0.9  0.7 - 3.8  [facility]  

 

 Alkaline phosphatase  76  40 - 129 U/L  [facility]  

 

 AST  31  10 - 50 U/L  [facility]  

 

 ALT  18  5 - 50 U/L  [facility]  

 

 Total bilirubin  1.0  0.0 - 1.2  [facility]  



     mg/dL  HOSPITAL  









 Specimen

 

 Plasma specimen









 Performing Organization  Address  City/State/Zipcode  Phone Number

 

 [facility] DEPARTMENT OF PATHOLOGY AND  [address] [place] [phone]  



 GENOMIC MEDICINE      

 

 [facility]  [address] [place] [address]  



Us vein mapping lower extremity (2019 10:10 PM CDT)





 Specimen

 

 









 Narrative  Performed At

 

  



   [facility]



 [facility]



  



 Lower Extremity Vein Mapping Report



  



  [address] [place] [address]



  



  



  



 Pat.Name:NIDHI LARSON  



 Pat.ID:[id number] 



  



 .Date:  



 2019Refer.MD:JOSUE HEMPHILL MD



  



 Exam Time: 9:18:00 PMStudy Type:LE Vein  



 Mapping 



  



 Height:75inDOBAge:  



 1945,73Y 



  



 Sex: MALESonogrphr:  



 Gab Chaves, GABI, MISSY 



  



 Pat. Stat.:Inpatient Room:[address]



  



 TapeVol: LEA, CPT - 4:  



 [address] 



  



 Echo Event ID:[id number]



  



 Order ID:[id number]



  



 Reason for Study:LE venous mapping for CABG. History ofB/L PAD s/p



  



 bypasses, DM , HTN, CAD s/p multiple stents 



  



 Procedures:Colorflow, Grayscale/2D, Pulsed wave Doppler



  



 Race:C 



  



 ------------------------------------



  



 SUMMARY:



  



 ------------------------------------



  



 DUPLEX SCAN OBSERVATIONS



  



  



  



  Deep VeinsSuperficial Veins



  



 RightLeft RightLeft



  



  GSV (prox) NormalNormal



  



  CFV Normal Normal (above knee)



  



  Femoral Normal Normal GSV (dist) Normal Normal



  



  Profunda Normal Normal (below knee)



  



  Popliteal Normal Normal



  



  PT (prox) Not Visualized NormalSSV Normal Normal



  



  PT (dist) Normal Normal 



  



  Peroneal Normal Normal 



  



  gastroc Not visualized Normal 



  



  



  



  RIGHT:There is normal compressibility with no evidence of echogenic



  



 material noted within the lumen of the visualized veins. Color flow



  



 and Doppler signals are normal.



  



  



  



  LEFT: There is normal compressibility with no evidence of



  



 echogenic material noted within the lumen of the visualized veins.



  



 Color flow and Doppler signals are normal.



  



  



  



  PRELIMINARY FINDINGS



  



  1. No evidence of venous thrombosis of the visualized veins,



  



 bilaterally.



  



  2. There is a patent bypass graftin the thigh originating from



  



 common femoral artery, bilaterally.



  



  3. Vein measurements are provided in the diagram.



  



  



  



  PHYSICIAN INTERPRETATION



  



  Venous examination of the both lower extremities demonstrated no



  



 evidence of venous thrombosis in the visualized veins.Normal



  



 compressibility and augmentation of all veins visualized.



  



  



  



 Signed 2019 07:56 AM



  



 Feliciano Ho MD, RPVI  









 Procedure Note

 

 Interface, Radiology Results In - 2019  7:57 AM CDT







                     Vascular [facility]trasound Laboratory



                   Lower Extremity Vein Mapping Report



            [address] [place] [address]



 



 Pat.Name:  NIDHI LARSON         Pat.ID:    [id number]



 .Date:   2019                Refer.MD:  JOSUE HEMPHILL MD



 Exam Time: 9:18:00 PM              Study Type:LE Vein Mapping



 Height:    75in                      Age:  1945,73Y



 Sex:       MALE                    Sonogrphr: GABI Villaseñor RCS



 Pat. Stat.:Inpatient               Room:      [address]



 Tape  Vol: JM,                     CPT - 4:   [address]



 Echo Event ID:[id number]



 Order ID:  [id number]



 Reason for Study:LE venous mapping for CABG. History ofB/L PAD s/p



 bypasses, DM , HTN, CAD s/p multiple stents



 Procedures:Colorflow, Grayscale/2D, Pulsed wave Doppler



 Race:      C



 ------------------------------------



 SUMMARY:



 ------------------------------------



 DUPLEX SCAN OBSERVATIONS



 



    Deep Veins  Superficial Veins



   Right  Left Right  Left



      GSV (prox) Normal  Normal



  CFV Normal Normal (above knee)



  Femoral Normal Normal GSV (dist) Normal Normal



  Profunda Normal Normal (below knee)



  Popliteal Normal Normal



  PT (prox) Not Visualized Normal  SSV Normal Normal



  PT (dist) Normal Normal



  Peroneal Normal Normal



  gastroc Not visualized Normal



 



  RIGHT:  There is normal compressibility with no evidence of echogenic



 material noted within the lumen of the visualized veins. Color flow



 and Doppler signals are normal.



 



  LEFT:     There is normal compressibility with no evidence of



 echogenic material noted within the lumen of the visualized veins.



 Color flow and Doppler signals are normal.



 



  PRELIMINARY FINDINGS



  1. No evidence of venous thrombosis of the visualized veins,



 bilaterally.



  2. There is a patent bypass graft  in the thigh originating from



 common femoral artery, bilaterally.



  3. Vein measurements are provided in the diagram.



 



  PHYSICIAN INTERPRETATION



  Venous examination of the both lower extremities demonstrated no



 evidence of venous thrombosis in the visualized veins.  Normal



 compressibility and augmentation of all veins visualized.



 



 Signed 2019 07:56 AM



 Feliciano Ho MD, RPVI









 Performing Organization  Address  City/State/Zipcode  Phone Number

 

  [facility]  [address] [place] [address]  



NM Lung Ventilation Perfusion (2019  6:48 PM CDT)





 Specimen

 

 









 Narrative  Performed At

 

 CLINICAL HISTORY: PE suspectedhigh pretest prob



   [facility]



  



  



 TECHNIQUE:



  



 The patient breathed 15-20 mCi of xenon-133 gas through a closed  



 ventilation system while dynamic imaging of the lungs was performed in  



 the posterior and anterior projections. The patient was then injected  



 with 5 mCi of technetium-99m-MAA intravenously, 



  



 followed by imaging of the lungs in anterior, posterior, and oblique  



 projections. 



  



  



  



 FINDINGS: 



  



 Large areas of mildly reduced perfusion in the upper lobes on the  



 oblique views, partly related to a prominent left hilum and partly to  



 attenuation artifacts. No well-defined perfusion defects. Ventilation  



 images are unremarkable. 



  



  



  



 IMPRESSION: 



  



  



  



 Low Probability for pulmonary embolism.



  



  



  



  



  



  



  



  



  



 [facility]-[id number]



  



   









 Procedure Note

 

  Interface, Radiology Results Incoming - 2019  6:53 PM CDT



CLINICAL HISTORY: PE suspected  high pretest prob



 



 TECHNIQUE:



 The patient breathed 15-20 mCi of xenon-133 gas through a closed ventilation 
system while dynamic imaging of the lungs was performed in the posterior and 
anterior projections. The patient was then injected with 5



 mCi of technetium-99m-MAA intravenously,



 followed by imaging of the lungs in anterior, posterior, and oblique 
projections.



 



 FINDINGS:



 Large areas of mildly reduced perfusion in the upper lobes on the oblique views
, partly related to a prominent left hilum and partly to attenuation artifacts. 
No well-defined perfusion defects. Ventilation images are unremarkable.



 



 IMPRESSION:



 



     Low Probability for pulmonary embolism.



 



 



 



 



 [facility]-[id number]









 Performing Organization  Address  City/State/Zipcode  Phone Number

 

 [facility]  [address] [place] [address]  



SS-B antibody (2019  6:32 PM CDT)





 Component  Value  Ref Range  Performed At  Pathologist



         Signature

 

 Sjogren's SS-B  <0.2  0.0 - 0.9 [facility]  



 antibody      [facility]  

 

 SS-B antibody  Negative  [facility]  



 interp  Comment:    [facility]  



   SS-B/La antibody is seen in patients with Sjogren syndrome, but may also be 
   HOSPITAL  



   positive with systemic lupus erythematosus (SLE), and systemic sclerosis.   
   



         









 Specimen

 

 Serum









 Performing Organization  Address  City/State/Zipcode  Phone Number

 

 [facility] DEPARTMENT OF PATHOLOGY AND  [address] [place] [address]  



 [address] [address]  



SS-A antibody (2019  6:32 PM CDT)





 Component  Value  Ref Range  Performed At  Pathologist



         Signature

 

 Sjogren's SS-A  <0.2  0.0 - 0.9 [facility]  



 antibody      [facility]  

 

 SS-A antibody  Negative  [facility]  



 interp  Comment:    [facility]  



   SS-A antibody is sensitive for Sjogren's syndrome, but may also be positive 
   HOSPITAL  



   with systemic lupus erythematosus (SLE), and systemic sclerosis.      



         









 Specimen

 

 Serum









 Performing Organization  Address  City/[facility]/Zipcode  Phone Number

 

 [facility] DEPARTMENT OF PATHOLOGY AND  [address] [place] [address]  



 [address] [address]  



Scl-70 antibody (2019  6:32 PM CDT)





 Component  Value  Ref Range  Performed At  Pathologist



         Signature

 

 Scleroderma SCL-70  <0.2  0.0 - 0.9 [facility]  



 Ab      [facility]  

 

 Scl-70 antibody  Negative  [facility]  



 interp  Comment:    [employer]  



   Anti-Scl-70 (topoisomerase I) antibodies are found in patients with    
HOSPITAL  



   systemic sclerosis (SSc or scleroderma), and have been reported to be      



   predictive of diffuse cutaneous involvement. Anti-Scl-70 antibodies may      



   also be present in patients with systemic lupus erythematosus (SLE).      



         









 Specimen

 

 Serum









 Performing Organization  Address  City/State/Zipcode  Phone Number

 

 [facility] DEPARTMENT OF PATHOLOGY AND  [address] [place] [address]  



HIV Ag/Ab combination (2019  6:31 PM CDT)





 Component  Value  Ref Range  Performed At  [facility]

 

 HIV Ag/Ab combination  Non-reactive  Non-reactive  [facility]  









 Specimen

 

 Blood









 Performing Organization  Address  City/[facility]/Zipcode  Phone Number

 

 [facility] DEPARTMENT OF PATHOLOGY AND  [address] [place] [address]  



CRP high sensitivity (2019  6:31 PM CDT)





 Component  Value  Ref Range  Performed At  [facility]

 

 CRP, high  17.41  mg/L  [place]  



 sensitivity  Comment:    [facility]  



   Please note this test is different from the C-Reactive Protein    HOSPITAL  



   (CRP) assay. CRP is a nonspecific marker of inflammation and      



   its levels rise in the presence of conditions such as infection      



   and inflammatory disorders. Persistent low levels of CRP can be      



   measured with a high-sensitivity assay (hsCRP) andare      



   associated with increased risks for atherosclerotic diseases.      



   High-Sensitivity CRP (hsCRP) results are used to assign risk      



   for stroke, acute myocardial infarction and peripheral vascular      



   disease as follows:      



   Low risk: < 1.00 mg/L      



   Average risk: 1.00 - 3.00 mg/L      



   High risk: > 3.00 - 10.00 mg/L      



   Indeterminate: > 10.00 mg/L *      



    *May be indicative of another source of inflammation or infection      



         









 Specimen

 

 Plasma specimen









 Performing Organization  Address  City/[facility]/Zipcode  Phone Number

 

 [facility] DEPARTMENT OF PATHOLOGY AND  [address] [place] [address]  



DORETHA (2019  6:31 PM CDT)





 Component  Value  Ref Range  Performed At  [facility]

 

 DORETHA screen  Negative  Negative  [facility]  









 Specimen

 

 Blood









 Performing Organization  Address  City/State/Zipcode  Phone Number

 

 [facility] DEPARTMENT OF PATHOLOGY AND  [address] [place] [address]  



Us carotid duplex (2019 11:00 AM CDT)





 Specimen

 

 









 Narrative  Performed At

 

  



  [facility]



  



  Carotid Artery Duplex Report



  



  [address] [facility] [address], [place] [address]



  



  



  



 For  purposes, the categorization of the degree of the  



 stenosis of this exam is based on criteria described in the IAC carotid  



 stenosis grading white paper( www.intersocietal.org/Vascular) and GRACE Preston., BRUCE Briggs., et al. Carotid 



  



 artery stenosis: gray-scale and Doppler US diagnosis--Society of  



 Radiologists in Ultrasound Consensus Conference. Radiology. 2003 Nov;  



 229(2):340-6.



  



  



  



  



  



 Pat.Name:NIDHI LARSON  



 Pat.ID:[id number] 



  



 .Date:  



 2019Refer.MD:JOSUE HEMPHILL MD



  



 Exam Time: 10:28:00 AM Study  



 Type:Carotid 



  



 Height:75inDOBAge:  



 1945,73Y 



  



 Sex: MALESonogrphr:  



 GABI Albright



  



 Pat. Stat.:Inpatient Room:  



 



  



 TapeVol: , CPT - 4:  



 39133 



  



 Echo Event ID:[id number]



  



 Order ID:[id number]



  



 Reason for Study:Pre-op CV exam CABG evaluation; CAD and Diabetic. 



  



 Procedures:Colorflow, Grayscale/2D, Pulsed wave Doppler



  



 Race:C 



  



 ------------------------------------



  



 SUMMARY:



  



 ------------------------------------



  



 PHYSICAL ASSESSMENT



  



 BloodPulsesCarotid



  



 Pressure Carotid TemporalBruit



  



  Right 118/73 ++0



  



  Left IV ++0



  



  



  



  CAROTID ARTERY SCAN



  



  RIGHT:There is scattered hard plaquein the common carotid  



 artery.



  



 There is hard and calcified plaque noted in the bulb extending into



  



 the proximal internal and external carotid artery.Colorflow is



  



 normal.



  



 



  



  LEFT:There is scattered hard plaquein the common carotid



  



 artery. There is hard and calcified plaque noted in the bulb extending



  



 into the proximal internal and external carotid artery.Colorflow is



  



 normal. 



  



  



  



  PRELIMINARY FINDINGS



  



  1.<50%stenosis in the bulb and internal carotid artery,



  



 bilaterally. 



  



  2.<50% stenosis in the external carotid artery, bilaterally.



  



  3. Non-stenotic plaque in the common carotid  



 artery,bilaterally. 



  



  



  



  PHYSICIAN INTERPRETATION 



  



  Bilateral carotid duplex examination demonstrated atherosclerotic



  



 plaques in the bulbs/ CCAs. 



  



  Less than 50% stenosis in the bulb and internal carotid artery,



  



 bilaterally.



  



  Both vertebral arteries are antegrade.



  



  



  



 Carotid  



 Findings:RightLeft  



  



  



 Verteb.Flw  



 AntegradeAntegrade  



 



  



 Subclavian  



 TriphasicTriphasic  



 



  



 ------------------------------------



  



 MEASUREMENTS:



  



 ------------------------------------



  



 DOPPLER



  



 Right CCA Dist



  



  CCA Dist PSV69 cm/sCCA Dist  



 EDV 9 cm/s



  



 Right CCA Mid



  



  CCA Mid PSV 74.2 cm/sCCA Mid  



 EDV7.7 cm/s



  



 Right CCA Prox



  



  CCA Prox PSV67.7 cm/sCCA Prox  



 EDV 9 cm/s



  



 Right ECA Prox



  



  ECA Prox PSV93.8 cm/sECA Prox  



 EDV18.9 cm/s



  



 Right ICA Dist



  



  ICA Dist PSV45.3 cm/Marlys Dist  



 EDV10.1 cm/s



  



 Right ICA Mid



  



  ICA Mid PSV 86.2 cm/Marlys Mid EDV  



 18.9 cm/s



  



 Right ICA Prox



  



  ICA Prox PSV59 cm/Marlys Prox  



 EDV16.3 cm/s



  



 Right Vertebral



  



  Vertebral PSV 32.1 cm/sVertebral EDV 6.81  



 cm/s



  



 Right Subclavian



  



  Subclavian  cm/sSubclavian EDV 0  



 cm/s



  



 Left CCA Dist



  



  CCA Dist PSV79 cm/sCCA Dist  



 EDV10.9 cm/s



  



 Left CCA Mid



  



  CCA Mid PSV 74.6 cm/sCCA Mid EDV  



 14.2 cm/s



  



 Left CCA Prox



  



  CCA Prox  cm/sCCA Prox  



 EDV10.2 cm/s



  



 Left ECA Prox



  



  ECA Prox  cm/sECA Prox  



 EDV5.11 cm/s



  



 Left ICA Dist



  



  ICA Dist PSV59.3 cm/Marlys Dist  



 EDV12.3 cm/s



  



 Left ICA Mid



  



  ICA Mid PSV 61 cm/Marlys Mid EDV  



 16.9 cm/s



  



 Left ICA Prox



  



  ICA Prox PSV86.4 cm/Marlys Prox  



 EDV7.43 cm/s



  



 Left Vertebral



  



  Vertebral PSV 45.1 cm/sVertebral EDV 11.6  



 cm/s



  



 Left Subclavian



  



  Subclavian  cm/sSubclavian EDV 0  



 cm/s



  



 Right ICA/CCA Ratio



  



  ICA/CCA PSV0.795



  



 Left ICA/CCA Ratio



  



  ICA/CCA PSV 1.16 



  



  



  



 Signed 2019 09:44 AM



  



 Feliciano Ho MD, RPVI  









 Procedure Note

 

 Interface, Radiology Results In - 2019  9:45 AM CDT







                     [facility]



                      Carotid Artery Duplex Report



            [address] [address]



 



 For  purposes, the categorization of the degree of the 
stenosis of this exam is based on criteria described in the IAC carotid 
stenosis grading white paper( www.intersocietal.org/Vascular) and EDD Preston, SANDEEP Briggs, et al. Carotid



 artery stenosis: gray-scale and Doppler US diagnosis--Society of Radiologists 
in Ultrasound Consensus Conference. Radiology. 2003 Nov; 229(2):340-6.



 



 



 Pat.Name:  NIDHI LARSON         Pat.ID:    [id number]



 St.Date:   2019                Refer.MD:  JOSUE HEMPHILL MD



 Exam Time: 10:28:00 AM             Study Type:Carotid



 Height:    75in                      Age:  1945,73Y



 Sex:       MALE                    Sonogrphr: GABI Albright



 Pat. Stat.:Inpatient               Room:      WT 19-07



 Tape  Vol: ,                     CPT - 4:   77797



 Echo Event ID:[id number]



 Order ID:  [id number]



 Reason for Study:Pre-op CV exam CABG evaluation; CAD and Diabetic.



 Procedures:Colorflow, Grayscale/2D, Pulsed wave Doppler



 Race:      C



 ------------------------------------



 SUMMARY:



 ------------------------------------



 PHYSICAL ASSESSMENT



     Blood              Pulses      Carotid



   Pressure Carotid Temporal        Bruit



  Right   118/73     +        +          0



  Left   IV     +        +          0



 



  CAROTID ARTERY SCAN



  RIGHT:  There is scattered hard plaque  in the common carotid artery.



 There is hard and calcified plaque noted in the bulb extending into



 the proximal internal and external carotid artery.  Colorflow is



 normal.



 



  LEFT:    There is scattered hard plaque  in the common carotid



 artery. There is hard and calcified plaque noted in the bulb extending



 into the proximal internal and external carotid artery.  Colorflow is



 normal.



 



  PRELIMINARY FINDINGS



  1.  <50%  stenosis in the bulb and internal carotid artery,



 bilaterally.



  2.  <50% stenosis in the external carotid artery, bilaterally.



  3.   Non-stenotic plaque in the common carotid artery,  bilaterally.



 



  PHYSICIAN INTERPRETATION



  Bilateral carotid duplex examination demonstrated atherosclerotic



 plaques in the bulbs/ CCAs.



  Less than 50% stenosis in the bulb and internal carotid artery,



 bilaterally.



  Both vertebral arteries are antegrade.



 



 Carotid Findings:      Right                  Left



 Verteb.Flw             Antegrade              Antegrade



 Subclavian             Triphasic              Triphasic



 ------------------------------------



 MEASUREMENTS:



 ------------------------------------



                                 DOPPLER



 Right CCA Dist



  CCA Dist PSV      69 cm/s          CCA Dist EDV       9 cm/s



 Right CCA Mid



  CCA Mid PSV     74.2 cm/s          CCA Mid EDV      7.7 cm/s



 Right CCA Prox



  CCA Prox PSV    67.7 cm/s          CCA Prox EDV       9 cm/s



 Right ECA Prox



  ECA Prox PSV    93.8 cm/s          ECA Prox EDV    18.9 cm/s



 Right ICA Dist



  ICA Dist PSV    45.3 cm/s          ICA Dist EDV    10.1 cm/s



 Right ICA Mid



  ICA Mid PSV     86.2 cm/s          ICA Mid EDV     18.9 cm/s



 Right ICA Prox



  ICA Prox PSV      59 cm/s          ICA Prox EDV    16.3 cm/s



 Right Vertebral



  Vertebral PSV   32.1 cm/s          Vertebral EDV   6.81 cm/s



 Right Subclavian



  Subclavian PSV   129 cm/s          Subclavian EDV     0 cm/s



 Left CCA Dist



  CCA Dist PSV      79 cm/s          CCA Dist EDV    10.9 cm/s



 Left CCA Mid



  CCA Mid PSV     74.6 cm/s          CCA Mid EDV     14.2 cm/s



 Left CCA Prox



  CCA Prox PSV     107 cm/s          CCA Prox EDV    10.2 cm/s



 Left ECA Prox



  ECA Prox PSV     135 cm/s          ECA Prox EDV    5.11 cm/s



 Left ICA Dist



  ICA Dist PSV    59.3 cm/s          ICA Dist EDV    12.3 cm/s



 Left ICA Mid



  ICA Mid PSV       61 cm/s          ICA Mid EDV     16.9 cm/s



 Left ICA Prox



  ICA Prox PSV    86.4 cm/s          ICA Prox EDV    7.43 cm/s



 Left Vertebral



  Vertebral PSV   45.1 cm/s          Vertebral EDV   11.6 cm/s



 Left Subclavian



  Subclavian PSV   101 cm/s          Subclavian EDV     0 cm/s



 Right ICA/CCA Ratio



  ICA/CCA PSV    0.795



 Left ICA/CCA Ratio



  ICA/CCA PSV     1.16



 



 Signed 2019 09:44 AM



 Feliciano Ho MD, RPVI









 Performing Organization  Address  [facility]  Phone Number

 

 [facility]  [address] [place] [address]  



Thyroid stimulating hormone (2019  4:00 AM CDT)





 Component  Value  Ref Range  Performed At  Pathologist Signature

 

 TSH  6.50 (H)  0.27 - 4.20 uIU/mL  [facility]  









 Specimen

 

 Plasma specimen









 Performing Organization  Address  City[facility]  Phone Number

 

 [facility] DEPARTMENT OF PATHOLOGY AND  [address] [address]  



T4, free (2019  4:00 AM CDT)





 Component  Value  Ref Range  Performed At  Pathologist Signature

 

 T4, free  1.0  0.9 - 1.7 ng/dL  [facility]  









 Specimen

 

 Plasma specimen









 Performing Organization  Address  City/[facility]  Phone Number

 

 [facility] DEPARTMENT OF PATHOLOGY AND  [address] [place] [address]  



Vitamin D 25 hydroxy level (2019 12:00 AM CDT)





 Component  Value  Ref Range  Performed At  Pathologist



         Signature

 

 Vitamin D,  4.0 (L)  30.0 - 150.0  [facility]  



 25-hydroxy  Comment:  ng/mL  HOSPITAL  



   This assay reports the sum of 25-hydroxy vitamin D3 and 25-hydroxy vitamin  
    



   D2.      



   Reference range:      



   0-17 years:      



   Deficiency: less than 20ng/mL      



   Optimum level: greater than or equal to 20 ng/mL.      



   18 years and older:      



   Deficiency: less than 20ng/mL      



   Insufficiency: 20-29 ng/mL      



   Optimum Level: 30-80 ng/mL      



   The assay reportable range is 3.4155.9 ng/mL. Levels higher than 150    
  



   ng/mL may be associated with toxicity.      



   If toxicity is clinically suspected and the reported result is >155.9      



   ng/mL,contact lab for alternative methods to obtain a definitivelevel.  
    



   If separate quantitation of 25-hydroxy vitamin D3 and 25-hydroxy vitamin D2 
     



   is needed, please contact lab for alternative methods.      



         









 Specimen

 

 Blood









 Performing Organization  Address  City/State/Zipcode  Phone Number

 

 [facility] DEPARTMENT OF PATHOLOGY AND  [address] [place] [address]  



 [facility] MEDICINE      

 

 [facility]  [address] [place] [address]  



XR Knee 1 Or 2 Vw Right (2019  8:42 PM CDT)





 Specimen

 

 









 Narrative  Performed At

 

 EXAMINATION:XR KNEE 1 OR 2 VW RIGHT



   [facility]



  



  



 CLINICAL HISTORY:fall incident



  



  



  



 COMPARISON:None.



  



  



  



 IMPRESSION:



  



  



  



 No evidence of acute right knee fracture, dislocation, or significant  



 joint effusion. Bone mineralization is normal. Vascular calcifications.  



 Nonspecific soft tissue edema.



  



  



  



  



  



 [facility]-[id number]



  



   









 Procedure Note

 

  Interface, Radiology Results Incoming - 2019  8:49 PM CDT



EXAMINATION:  XR KNEE 1 OR 2 VW RIGHT



 



 CLINICAL HISTORY:  fall incident



 



 COMPARISON:  None.



 



 IMPRESSION:



 



 No evidence of acute right knee fracture, dislocation, or significant joint 
effusion. Bone mineralization is normal. Vascular calcifications. Nonspecific 
soft tissue edema.



 



 



 [facility]-[id number]









 Performing Organization  Address  City/[facility]/Zipcode  Phone Number

 

  [facility]  [address] [place] [address]  



XR Hip 2-3 View Right (2019  8:41 PM CDT)





 Specimen

 

 









 Narrative  Performed At

 

 EXAMINATION:XR HIP 2-3 VIEWS RIGHT



   [facility]



  



  



 CLINICAL HISTORY:fall incident



  



  



  



 COMPARISON:None.



  



  



  



  



  



 IMPRESSION:



  



  



  



 There is mild osteopenia. There is no acute displaced fracture or  



 dislocation.



  



  



  



 There are mild degenerative changes of the visualized skeleton. There is  



 no focal cortical erosion or periosteal reaction to suggest  



 infectious/inflammatory osteoarthropathy. 



  



  



  



 There is atherosclerotic calcification of the vasculature. Multiple  



 surgical clips are seen in the right groin.



  



  



  



  



  



  



  



 [facility]-[id number]



  



   









 Procedure Note

 

  Interface, Radiology Results Incoming - 2019 10:01 PM CDT



EXAMINATION:  XR HIP 2-3 VIEWS RIGHT



 



 CLINICAL HISTORY:  fall incident



 



 COMPARISON:  None.



 



 



 IMPRESSION:



 



 There is mild osteopenia. There is no acute displaced fracture or dislocation.



 



 There are mild degenerative changes of the visualized skeleton. There is no 
focal cortical erosion or periosteal reaction to suggest infectious/
inflammatory osteoarthropathy.



 



 There is atherosclerotic calcification of the vasculature. Multiple surgical 
clips are seen in the right groin.



 



 



 



 [facility]-[id number]









 Performing Organization  Address  [facility]  Phone Number

 

 [facility]  [address] [place] [address]  



XR Elbow 2 Vw Right (2019  8:41 PM CDT)





 Specimen

 

 









 Narrative  Performed At

 

 EXAMINATION:XR ELBOW 2 VW RIGHT



   [facility]



  



  



 CLINICAL HISTORY:FALL INCIDENT



  



  



  



 COMPARISON:None.



  



  



  



 IMPRESSION:



  



  



  



 No evidence of acute right elbow fracture, dislocation, or joint  



 effusion. Olecranon enthesophyte. No acute soft tissue abnormality.



  



  



  



  



  



 [facility]-[id number]



  



   









 Procedure Note

 

  Interface, Radiology Results Incoming - 2019  8:50 PM CDT



EXAMINATION:  XR ELBOW 2 VW RIGHT



 



 CLINICAL HISTORY:  FALL INCIDENT



 



 COMPARISON:  None.



 



 IMPRESSION:



 



 No evidence of acute right elbow fracture, dislocation, or joint effusion. 
Olecranon enthesophyte. No acute soft tissue abnormality.



 



 



 [facility]-[id number]









 Performing Organization  Address  City/[place] [place]  Phone Number

 

 [facility]  [address] [place] [address]  



XR Ankle 2 Vw Right (2019  8:41 PM CDT)





 Specimen

 

 









 Narrative  Performed At

 

 Examination:XR ANKLE 2 VW RIGHT



   [facility]



  



  



 Clinical History: FALL INCIDENT



  



  



  



 Comparison: None.



  



  



  



 Findings:



  



 2 views of the right ankle are obtained. No acute fracture or  



 dislocation is seen. The joint spaces are within normal limits. Mild  



 lateral soft tissue swelling is noted. Spurring is seen at the medial  



 malleolus of the distal tibia.



  



  



  



 IMPRESSION:



  



 1. Mild lateral soft tissue swelling without evidence of bony fracture.



  



  



  



  



  



  



  



 [facility]-[id number]



  



   









 Procedure Note

 

  Interface, Radiology Results Incoming - 2019  8:51 PM CDT



Examination:  XR ANKLE 2 VW RIGHT



 



 Clinical History: FALL INCIDENT



 



 Comparison: None.



 



 Findings:



 2 views of the right ankle are obtained. No acute fracture or dislocation is 
seen. The joint spaces are within normal limits. Mild lateral soft tissue 
swelling is noted. Spurring is seen at the medial malleolus of the distal tibia.



 



 IMPRESSION:



 1. Mild lateral soft tissue swelling without evidence of bony fracture.



 



 



 



 [facility]-[id number]









 Performing Organization  Address  [facility]  Phone Number

 

 [facility]  [address] [place] [address]  



CT Head Wo Contrast (2019  7:43 PM CDT)





 Specimen

 

 









 Narrative  Performed At

 

 EXAMINATION: CT HEAD WO CONTRAST



   [facility]



  



  



 CLINICAL HISTORY: Head traumaintracranial arterial injury suspected,  



 fall incident



  



  



  



 COMPARISON:None.



  



  



  



 TECHNIQUE: Noncontrast enhanced images of the brain were obtained from  



 the skull base to the vertex. Both soft tissue and bone reconstruction  



 algorithms were performed.



  



  



  



 CT scans are performed using radiation dose reduction techniques  



 (iterative reconstruction and/or automated exposure control). Technical  



 factors are evaluated and adjusted to ensure appropriate moderation of  



 exposure. Automated dose management technology



  



  is applied to adjust radiation exposure while achieving a diagnostic  



 quality image.



  



  



  



 FINDINGS:



  



  



  



 Generalized age-related brain parenchymal volume loss. Moderate patchy  



 hypoattenuation of the supratentorial white matter, likely chronic  



 microangiopathic changes. Mild cerebrovascular calcifications. 



  



  



  



 The brain parenchyma is otherwise unremarkable. The gray-white matter  



 differentiation is preserved. No evidence of acute intra or extra-axial  



 hemorrhage, mass, mass effect or acute territorial infarction. There is  



 no acute hydrocephalus. Basal cisterns 



  



 are patent. 



  



  



  



 No acute soft tissue hematoma or laceration. No skull fractures or  



 aggressive bony lesions. 



  



  



  



 Paranasal sinuses and mastoid air cells are clear. Orbits are normal. 



  



  



  



 IMPRESSION:



  



  



  



 No acute intracranial abnormality identified.



  



  



  



  



  



 [facility]-[id number]



  



   









 Procedure Note

 

  Interface, Radiology Results Incoming - 2019  7:51 PM CDT



EXAMINATION: CT HEAD WO CONTRAST



 



 CLINICAL HISTORY: Head trauma  intracranial arterial injury suspected, fall 
incident



 



 COMPARISON:  None.



 



 TECHNIQUE: Noncontrast enhanced images of the brain were obtained from the 
skull base to the vertex. Both soft tissue and bone reconstruction algorithms 
were performed.



 



 CT scans are performed using radiation dose reduction techniques (iterative 
reconstruction and/or automated exposure control). Technical factors are 
evaluated and adjusted to ensure appropriate moderation of



 exposure. Automated dose management technology



  is applied to adjust radiation exposure while achieving a diagnostic quality 
image.



 



 FINDINGS:



 



 Generalized age-related brain parenchymal volume loss. Moderate patchy 
hypoattenuation of the supratentorial white matter, likely chronic 
microangiopathic changes. Mild cerebrovascular calcifications.



 



 The brain parenchyma is otherwise unremarkable. The gray-white matter 
differentiation is preserved. No evidence of acute intra or extra-axial 
hemorrhage, mass, mass effect or acute territorial infarction. There is no



 acute hydrocephalus. Basal cisterns



 are patent.



 



 No acute soft tissue hematoma or laceration. No skull fractures or aggressive 
bony lesions.



 



 Paranasal sinuses and mastoid air cells are clear. Orbits are normal.



 



 IMPRESSION:



 



 No acute intracranial abnormality identified.



 



 



 [facility]-[id number]









 Performing Organization  Address  City/State/Zipcode  Phone Number

 

  [facility]  [address] [place] [address]  



Echocardiogram complete w contrast and 3D if needed (2019  1:58 PM CDT)





 Specimen

 

 









 Narrative  Performed At

 

  



   [facility]



 Echocardiography Report



  



  [address] [place], [address], [place] [address]



  



  



  



 Pat.Name:NIDHI LARSON  



 Pat.ID:[id number] 



  



 .Date:  



 2019Refer.MD:JOSUE HEMPHILL MD



  



 Exam Time: 12:15:00 PM Study Type:Routine  



 Echo



  



 Height:75inWeight:  



 297lb 



  



 BSA: 2.6  



 p3KNLRaz:1945,73Y  



  



  



 Sex:  



 MALEBP:106/67  



 



  



 HR:88 bpm



  



 Sonogrphr: MISSY Evangelista



  



 Pat. Stat.:Inpatient  



 Room:[address]



  



 Study Status:Final 



  



 Echo Event ID:[id number]



  



 Order ID:[id number]



  



 Reason for Study:HF- Re-eval of known HF (systolic or diastolic) to



  



 guide therapy



  



 History / Clinical:Diabetes



  



 Procedures:2D Echo, Colorflow Doppler, Intravenous Optison Contrast



  



 ------------------------------------



  



 SUMMARY:



  



 ------------------------------------



  



 RV size is moderately enlarged.



  



  RV systolic function is moderately to severely depressed.



  



  Estimated PA systolic pressure is 55 mmHg, assuming a mean RAP of 5



  



 mmHg.



  



 ------------------------------------



  



 FINDINGS:



  



 ------------------------------------



  



 LV: LV size is normal. LV EF is normal. Overall wall motion  



 is



  



 normal.Estimated EF is 55-59%. Septal motion is



  



 paradoxicalsecondary to RV systolic pressure  



 overload.



  



 RV: RV size is moderately enlarged. RV systolic function is



  



 moderatelyto severely depressed.



  



 LA: LA size is normal.



  



 RA: RA volume is enlarged.



  



 AO: Aortic root diameter is normal.



  



 ALESIA: No pericardial effusion.



  



 AV: Mild thickening and calcification of AV leaflets.



  



 MV: No structural MV abnormalities noted.



  



 PV: No structural PV abnormalities noted.



  



 TV: No structural TV abnormalities noted. Mild tricuspid



  



 regurgitation 



  



 Other:Estimated PA systolic pressure is 55 mmHg, assuming a mean



  



 RAPof 5 mmHg.



  



 ------------------------------------



  



 MEASUREMENTS:



  



 ------------------------------------



  



 2D



  



 Parasternal [place]



  



  LVOT 2 cmLA  



 Ds3.6 cm



  



  LVIDd4.9  



 cmIndex1.9  



 cm/m



  



  Ao An2.2 cm



  



  LVIDs3.4 cmAo  



 Rtd 3.1 cm



  



  Index1.2 cm/m



  



  LV%fs 30.5 %



  



  LV Pttj221.8 g(122-174)



  



  IVSd 1.2 cmLVM  



 Index 81.1 g/m2



  



  LVPWd1.1  



 cmRWT0.4 



  



  



  



  



  



 Signed 2019 02:40 PM



  



 Thai Espinoza MD  









 Procedure Note

 

 Interface, Radiology Results In - 2019  2:41 PM CDT







                         Echocardiography Report



            [address] [place] [address]



 



 Pat.Name:  NIDHI LARSON         Pat.ID:    [id number]



 .Date:   2019                Refer.MD:  JOSUE HEMPHILL MD



 Exam Time: 12:15:00 PM             Study Type:Routine Echo



 Height:    75in                    Weight:    297lb



 BSA:       2.6 m2                    Age:  1945,73Y



 Sex:       MALE                    BP:        106/67



 HR:        88 bpm



 Sonogrphr: MISSY Evangelista



 Pat. Stat.:Inpatient               Room:      [address]



 Study Status:Final



 Echo Event ID:[id number]



 Order ID:  [id number]



 Reason for Study:HF- Re-eval of known HF (systolic or diastolic) to



 guide therapy



 History / Clinical:Diabetes



 Procedures:2D Echo, Colorflow Doppler, Intravenous Optison Contrast



 ------------------------------------



 SUMMARY:



 ------------------------------------



 RV size is moderately enlarged.



  RV systolic function is moderately to severely depressed.



  Estimated PA systolic pressure is 55 mmHg, assuming a mean RAP of 5



 mmHg.



 ------------------------------------



 FINDINGS:



 ------------------------------------



 LV:       LV size is normal. LV EF is normal. Overall wall motion is



           normal.  Estimated EF is 55-59%. Septal motion is



           paradoxical  secondary to RV systolic pressure overload.



 RV:       RV size is moderately enlarged. RV systolic function is



           moderately  to severely depressed.



 LA:       LA size is normal.



 RA:       RA volume is enlarged.



 AO:       Aortic root diameter is normal.



 ALESIA:     No pericardial effusion.



 AV:       Mild thickening and calcification of AV leaflets.



 MV:       No structural MV abnormalities noted.



 PV:       No structural PV abnormalities noted.



 TV:       No structural TV abnormalities noted. Mild tricuspid



           regurgitation



 Other:    Estimated PA systolic pressure is 55 mmHg, assuming a mean



           RAP  of 5 mmHg.



 ------------------------------------



 MEASUREMENTS:



 ------------------------------------



                                   2D



 Parasternal Long Axis



  LVOT               2 cm            LA Ds            3.6 cm



  LVIDd            4.9 cm            Index        1.9 cm/m



  Ao An            2.2 cm



  LVIDs            3.4 cm            Ao Rtd           3.1 cm



  Index        1.2 cm/m



  LV%fs           30.5 %



  LV Mass        210.8 g    (122-174)



  IVSd             1.2 cm            LVM Index       81.1 g/m2



  LVPWd            1.1 cm            RWT              0.4



 



 



 Signed 2019 02:40 PM



 Thai Espinoza MD









 Performing Organization  Address  City/[facility]/Zipcode  Phone Number

 

 [facility]  [address] [place] [address]  



B natriuretic peptide (2019  4:15 AM CDT)Only the most recent of2 
resultswithin the time period is included.





 Component  Value  Ref Range  Performed At  Pathologist Signature

 

 BNP  1,261 (H)  0 - 100 pg/mL  [facility]  









 Specimen

 

 









 Performing Organization  Address  City/[facility]  Phone Number

 

 [facility] DEPARTMENT OF PATHOLOGY AND  [address] [place] [address] [id number] [place] [address]  



Troponin (2019  4:10 AM CDT)Only the most recent of2 resultswithin the 
time period is included.





 Component  Value  Ref Range  Performed At  Pathologist Signature

 

 Troponin  0.34 (H)  0.00 - 0.30  [facility]  



   Comment:  ng/mL  HOSPITAL  



   0.30 - 1.49 ng/mlMay indicate increased risk of acute
      



    coronary syndrome.
      



   >=1.5 ng/mlConsistent with acute myocardial      



    infarction.      



   
      



   The diagnostic value of a single normal or non-diagnostic      



   result is questionable.Serial samples at 2-6 hour intervals      



   are required to rule out acute myocardial injury.      



         









 Specimen

 

 Plasma specimen









 Performing Organization  Address  [place] [place]  Phone Number

 

 [facility] DEPARTMENT OF PATHOLOGY AND  [address] [address]  



Protein, urine, random (2019 11:30 PM CDT)





 Component  Value  Ref Range  Performed At  Pathologist Signature

 

 Protein, urine random  8  mg/dL  [facility]  









 Specimen

 

 Urine









 Performing Organization  Address  [place] [place]  Phone Number

 

 [facility] DEPARTMENT OF PATHOLOGY AND  [address] [place] [address] [id number] [place] [address]  



Creatinine level, urine, random (2019 11:30 PM CDT)





 Component  Value  Ref Range  Performed At  Pathologist Signature

 

 Creatinine, urine,  74  mg/dL  [facility]  









 Specimen

 

 Urine









 Performing Organization  Address  City/State[facility]  Phone Number

 

 [facility] DEPARTMENT OF PATHOLOGY AND  [address] [address]  



Urinalysis, automated with microscopy (2019 11:30 PM CDT)





 Component  Value  Ref Range  Performed At  Pathologist Signature

 

 Color, UA  Straw    [facility]  

 

 Appearance, UA  Clear    [facility]  

 

 Specific gravity, UA  1.012  1.001 - 1.035  [facility]  

 

 pH, UA  5.0  5.0 - 8.5  [facility]  

 

 Protein, UA  Negative  Negative  [facility]  

 

 Glucose, UA  Negative  Negative  [facility]  

 

 Ketones, UA  Negative  Negative  [facility]  

 

 Bilirubin, UA  Negative  Negative  [facility]  

 

 Blood, UA  Small (A)  Negative  [facility]  

 

 Nitrite, UA  Negative  Negative  [facility]  

 

 Urobilinogen, UA  <2.0  <2.0  [facility]  

 

 Leukocyte esterase,  Negative  Negative  [facility]  

 

 WBC, UA  1  0 - 1 /HPF  [facility]  

 

 RBC, UA  3  0 - 5 /HPF  [facility]  

 

 Bacteria, UA  None seen  None seen  [facility]  

 

 Hyaline casts, UA  9  /LPF  [facility]  

 

 Yeast, UA  None seen    [facility]  

 

 Yeast with  None seen    [facility]  



 pseudohyphae, [facility]  









 Specimen

 

 Urine









 Performing Organization  Address  City/State/Zipcode  Phone Number

 

 [facility] DEPARTMENT OF PATHOLOGY AND  [address] [place] [address]  



Hemoglobin A1c (2019  1:42 AM CDT)Only the most recent of2 resultswithin 
the time period is included.





 Component  Value  Ref Range  Performed At  Pathologist



         Signature

 

 Hemoglobin A1C  10.7 (H)  4.0 - 5.6 %  [facility]  



   Comment:    HOSPITAL  



   HbA1c cutoffs for diagnosing diabetes:      



   4.0% - 5.6%=normal      



   5.7% - 6.4%=increased risk for diabetes (prediabetes)      



   >=6.5%=diabetes      



   Goals for glycemic control (ADA 2016)      



   < 7.0%Target for nonpregnant adults with diabetes.      



   More or less stringent targets may be appropriate for      



   individual patients.      



   <7.5% Target for Children and adolescents with type 1      



   diabetes.      



         









 Specimen

 

 









 Performing Organization  Address  City/State/Zipcode  Phone Number

 

 [facility] DEPARTMENT OF PATHOLOGY AND  [address] [place] [address]  



ECG 12 lead (2019 12:47 AM CDT)





 Component  Value  Ref Range  Performed At  Pathologist Signature

 

 Ventricular rate  96    [facility]  

 

 Atrial rate  96    [facility]  

 

 GA interval  176    [facility]  

 

 QRSD interval  102    [facility]  

 

 QT interval  390    [facility]  

 

 QTC interval  492    [facility]  

 

 P axis 1  32    [facility]  

 

 QRS axis 1  117    [facility]  

 

 T wave axis  179    [facility]  

 

 EKG impression  Normal sinus    [facility]  



   rhythm-Left posterior      



   fascicular block-Cannot      



   rule out Inferior      



   infarct , age      



   undetermined-Abnormal      



   ECG-No previous ECGs      



   available-Electronicall      



   y Signed By Conner Watts MD ([id number]) on 2019      



   10:53:27 AM      









 Specimen

 

 









 Narrative  Performed At

 

 This result has an attachment that is not available.



  









 Performing Organization  Address  City/[facility]/Zipcode  Phone Number

 

 [address] [address]  



Partial thromboplastin time, activated (2019 12:40 AM CDT)





 Component  Value  Ref Range  Performed At  Pathologist



         Signature

 

 PTT  29.3  23.0 - 36.0  [facility]  



   Comment:  [facility]  



   PTT therapeutic range for unfractionated heparin is      



   61.0-112.0 seconds which corresponds to Anti-Xa      



   0.3-0.7 U/ml.      



         









 Specimen

 

 Blood









 Performing Organization  Address  City/[facility]/Zipcode  Phone Number

 

 [facility] DEPARTMENT OF PATHOLOGY AND  [address] [place] [address]  



 [address] [address]  



Prothrombin time with INR (2019 12:40 AM CDT)





 Component  Value  Ref Range  Performed At  Pathologist



         Signature

 

 Prothrombin time  15.1 (H)  11.5 - 14.5  [facility]  

 

 INR  1.2    [place]  



   Comment:    [facility]  



   The International Normalized Ratio (INR) is a therapeutic    HOSPITAL  



   monitoring tool for patients who are stable on oral      



   anticoagulant therapy. An INR of 2.0-3.0 is suggested for deep      



   vein thrombosis/pulmonary embolism.      



         









 Specimen

 

 Blood









 Performing Organization  Address  City/[facility]/Zipcode  Phone Number

 

 [facility] DEPARTMENT OF PATHOLOGY AND  [address] [place] [address]  



 [address] [address]  



Type and screen (2019 12:40 AM CDT)





 Component  Value  Ref Range  Performed At  Pathologist Signature

 

 ABO grouping  AB    [facility]  

 

 Rh type  POS    [facility]  

 

 Antibody screen (gel)  NEG    [facility]  









 Specimen

 

 Blood









 Performing Organization  Address  City/[facility]/Zipcode  Phone Number

 

 [facility] DEPARTMENT OF PATHOLOGY AND  [address] [place] [address]  



 [address] [address]  



Lactic acid level (2019 12:40 AM CDT)





 Component  Value  Ref Range  Performed At  Pathologist Signature

 

 Lactic acid  1.9  0.5 - 2.2 mmol/L  [facility]  









 Specimen

 

 Plasma specimen









 Performing Organization  Address  [facility]  Phone Number

 

 [facility] DEPARTMENT OF PATHOLOGY AND  [address] [place] [address]  



 [address] [address]  



Creatine kinase, total (CPK) (2019 12:40 AM CDT)





 Component  Value  Ref Range  Performed At  Pathologist Signature

 

 Creatine kinase  226  39 - 308 U/L  [facility]  









 Specimen

 

 Plasma specimen









 Performing Organization  Address  [facility]  Phone Number

 

 [facility] DEPARTMENT OF PATHOLOGY AND  [address] [place] [address]  



 [address] [address]  



Lipid panel (2019 12:40 AM CDT)





 Component  Value  Ref Range  Performed At  Pathologist



         Signature

 

 Cholesterol  107  <200 mg/dL  [facility]  

 

 Triglycerides  86  <150 mg/dL  [facility]  

 

 HDL cholesterol  40  >40 mg/dL  [facility]  

 

 LDL cholesterol  56Comment: Result  <100 mg/dL  [place]  



   obtained by direct    [facility]  



   LDL measurement    [facility]  

 

 Lipid panel  [facility]  



 interpretation  Comment:    [facility]  



   Total Cholesterol (mg/dL)    [facility]  



    <200 Desirable      



    200-239Borderline-high      



    >=240High      



         



   Triglycerides (mg/dL)      



    <150 Normal      



    [id number]      



    200-499High      



    >=500Very high      



   HDL Cholesterol (mg/dL)      



    <40Low (male)      



    <40Low (female)      



   LDL Cholesterol (mg/dL)      



    <100 Optimal      



    100-129Near or above optimal      



    130-159Borderline-high      



    160-189High      



    >=190Very high      



   
      



   Risk Catergories that modify LDL goals.      



   Risk CatergoriesLDL goal (mg/dL)      



   CHD and CHD risk equivalent<100      



   (10-year risk >20%)      



   Multiple (2+) risk factors <130      



   (10-year risk=<20%)      



   0-1 risk factors <160      



   (<10-year risk)      



   Defining levels of lipids in metabolic syndrome      



   Triglycerides>=150 mg/dL      



   HDL Cholesterol      



   Men<40 mg/dL    
  



   Women<40 mg/dL      



   Non-HDL cholesterol is a second target for therapy in persons      



   with high triglycerides (>=200 mg/dL)      









 Specimen

 

 Plasma specimen









 Performing Organization  Address  City/State/Zipcode  Phone Number

 

 [facility] DEPARTMENT OF PATHOLOGY AND  [address] [place] [address]  



 GENOMIC MEDICINE      

 

 [address] [address]  



after 2018



Insurance







 Payer  Benefit Plan / Group  Subscriber ID  Effective Dates  Phone  Address  
Type

 

 [employer]  [facility]  2019-Present      O









 Guarantor Name  Account Type  Relation to  Date of Birth  Phone  Billing



     Patient      Address

 

 Nidhi Larson  Personal/Family  Self  1945  [phone]  [address] [facility]







         (Bud)  [place]



           [id number]







Advance Directives

Patient has advance care planning documents on file. For more information, 
please contact:[address] [address]

## 2019-07-14 NOTE — RAD REPORT
EXAM DESCRIPTION:  CT - Abdomen   Pelvis Wo Contrast - 7/14/2019 6:56 pm

 

CLINICAL HISTORY:  Abdominal  pain

 

COMPARISON:   None

 

TECHNIQUE:  Computed axial tomography of the abdomen and pelvis was obtained. IV and oral contrast we
re not requested.

 

All CT scans are performed using dose optimization technique as appropriate and may include automated
 exposure control or mA/KV adjustment according to patient size.

 

FINDINGS:   The evaluation of solid organs, vessels and bowel is limited secondary to the lack of con
trast administration.

 

A 24 millimeter peripherally calcified cystic mass dome of the liver

 

Spleen, pancreas, adrenals and kidneys appear grossly normal.

 

The appendix is normal. There is no evidence of diverticulitis.

 

Spondylosis involves lumbar spine resulting spinal stenosis

 

3.3 centimeter saccular infrarenal abdominal aortic aneurysm

 

Moderate tree-in-bud opacities left lower lobe

 

Coronary arterial calcifications

 

IMPRESSION:  3.3 centimeter abdominal aortic aneurysm

 

24 millimeter hepatic mass probably benign. Follow-up ultrasound 6 months recommended for re-evaluati
on

 

Moderate tree-in-bud opacities left lower lobe probably indicating an atypical infection

## 2019-07-14 NOTE — RAD REPORT
EXAM DESCRIPTION:  Nit Single View7/14/2019 7:35 pm

 

CLINICAL HISTORY:  Chest pain

 

COMPARISON:  CT abdomen July 14, 2019

 

FINDINGS:   Left basilar opacities are better seen on the CT scan on the same date.

 

Right lung appears clear

 

Heart is moderately enlarged

 

IMPRESSION:  Left basilar pneumonia

## 2019-07-14 NOTE — EDPHYS
Physician Documentation                                                                           

 [facility]                                                                 

Name: Laci Larson                                                                              

Age: 74 yrs                                                                                       

Sex: Male                                                                                         

: 1945                                                                                   

MRN: [id number]                                                                                   

Arrival Date: 2019                                                                          

Time: 18:26                                                                                       

Account#: [id number]                                                                            

Bed 6                                                                                             

Private MD:                                                                                       

ED Physician Chester Love                                                                      

HPI:                                                                                              

                                                                                             

18:30 This 74 yrs old  Male presents to ER via EMS with complaints of Abdominal      rn  

      Pain, Chest Pain.                                                                           

18:30 The patient or guardian reports chest pain that is located primarily in the substernal  rn  

      area. Onset: 3 day(s) ago. The pain does not radiate. Associated signs and symptoms:        

      Pertinent positives: abdominal pain, cough, lightheadedness, shortness of breath. The       

      chest pain is described as a heaviness. Duration: The patient or guardian reports           

      multiple episodes, that are intermittent. Modifying factors: The symptoms are               

      alleviated by NTG, the symptoms are aggravated by nothing. Severity of pain: At its         

      worst the pain was mild in the emergency department the pain is unchanged. The patient      

      has experienced similar episodes in the past. Reports "just doesn't feel well", began 3     

      days ago with chest and abd pain. States has chest pain about twice daily and is normal     

      for him, has angina, takes NTG that helps. Today called 911 more for abd pain, LLQ,         

      assoc with constipation, which he normally suffers from, no vomiting. Also reports          

      non-productive cough that began this AM. EMS placed nitropaste which dropped BP, wiped      

      off and BP improved. .                                                                      

                                                                                                  

Historical:                                                                                       

- Allergies:                                                                                      

18:39 No Known Allergies;                                                                     iw  

- Home Meds:                                                                                      

18:30 aspirin 81 mg Oral chew 1 tab once daily [Active]; citalopram 20 mg tab 1 tab once      iw  

      daily [Active]; fentanyl 50 mcg/hr Topical pt72 1 patch every 72 hours [Active];            

      glimepiride 4 mg Oral tab 1 tab once daily [Active]; isosorbide mononitrate 60 mg Oral      

      Tb24 1 tab once daily [Active]; metoprolol tartrate 50 mg Oral tab 25 mg 2 times per        

      day [Active]; Norco  mg Oral tab 1 tab twice a day [Active]; Plavix 75 mg Oral        

      tab 1 tab once daily [Active];                                                              

18:39 torsemide 20 mg oral tab 1 tab once daily [Active]; atorvastatin 80 mg oral tab 1 tab   iw  

      once daily [Active]; Novolin R Sub-Q 30 unit daily [Active];                                

- PMHx:                                                                                           

18:30 Diabetes - NIDDM; Hyperlipidemia; Hypertension;                                         iw  

- PSHx:                                                                                           

18:30 None;                                                                                   iw  

                                                                                                  

- Immunization history:: Adult Immunizations not up to date.                                      

- Social history:: Smoking status: Patient/guardian denies using tobacco.                         

- Ebola Screening: : Patient negative for fever greater than or equal to 101.5 degrees            

  Fahrenheit, and additional compatible Ebola Virus Disease symptoms Patient denies               

  exposure to infectious person Patient denies travel to an Ebola-affected area in the            

  21 days before illness onset No symptoms or risks identified at this time.                      

- Family history:: not pertinent.                                                                 

- Hospitalizations: : No recent hospitalization is reported.                                      

                                                                                                  

                                                                                                  

ROS:                                                                                              

18:30 Constitutional: + fever and chills Eyes: Negative for injury, pain, redness, and        rn  

      discharge, ENT: Negative for injury, pain, and discharge, Neck: Negative for injury,        

      pain, and swelling, Cardiovascular: + chest pain Respiratory: + non-productive cough, +     

      sob, negative for hemoptysis Abdomen/GI: + abd pain and constipation MS/Extremity:          

      Negative for injury and deformity, Skin: Negative for injury, rash, and discoloration,      

      Neuro: Negative for headache, numbness, tingling, and seizure.                              

                                                                                                  

Exam:                                                                                             

18:30 Constitutional:  Overweight male, tachypneic appears uncomfortable Head/Face:           rn  

      Normocephalic, atraumatic. ENT:  dry MM Cardiovascular:  tachycardic, regular               

      Respiratory:  + mild tachypnea with bibasilar crackles Abdomen/GI:  soft, + mild LLQ        

      tenderness, no rebound MS/ Extremity:  Pulses equal, no cyanosis.  Neurovascular            

      intact.  Full, normal range of motion.  Equal circumference. Neuro:  Awake and alert,       

      GCS 15, oriented to person, place, time, and situation.  Moves all 4 extremities with       

      equal strength.                                                                             

                                                                                                  

Vital Signs:                                                                                      

18:29  / 68; Pulse 105; Resp 20; Temp 99.1(TE); Pulse Ox 82% on R/A; Weight 120.2 kg;   iw  

      Height 6 ft. 3 in. (190.50 cm); Pain 7/10;                                                  

18:30 Pulse Ox 82% on R/A;                                                                    sv  

19:45  / 64; Pulse 102; Resp 16; Pulse Ox 94% on 3 lpm NC;                              lp1 

20:30  / 91; Pulse 102; Resp 20; Pulse Ox 94% on 3 lpm NC;                              lp1 

21:46  / 62; Pulse 109; Resp 17 S; Temp 98.3(O); Pulse Ox 93% on 3 lpm NC;              jd3 

18:29 Body Mass Index 33.12 (120.20 kg, 190.50 cm)                                            iw  

18:30 Pt placed on O2 \T\ 3L per NC, O2 sat up to 88%. Pt placed on 50% Venturi mask, O2 sat up sv  

      to 95%.                                                                                     

                                                                                                  

MDM:                                                                                              

18:27 Patient medically screened.                                                             rn  

18:59 ED course: Assumed care from Dr. Jang at shift change. Patient stable angina. CP x 2   ps1 

      days. Has a hx of diffuse CAD. Patient of [place]. States he had a fever, cough              

      recently. Pending labs and imaging. .                                                       

                                                                                                  

                                                                                             

18:28 Order name: Basic Metabolic Panel                                                       rn  

                                                                                             

18:28 Order name: CBC with Diff; Complete Time: 18:59                                         rn  

                                                                                             

18:28 Order name: LFT's; Complete Time: 19:28                                                 rn  

                                                                                             

18:28 Order name: NT PRO-BNP; Complete Time: 19:28                                            rn  

                                                                                             

18:28 Order name: PT-INR; Complete Time: 19:28                                                rn  

                                                                                             

18:28 Order name: Troponin (emerg Dept Use Only); Complete Time: 19:28                        rn  

                                                                                             

18:28 Order name: XRAY Chest (1 view): cough; Complete Time: 20:15                            rn  

                                                                                             

18:28 Order name: Creatinine for Radiology; Complete Time: 19:28                              rn  

                                                                                             

18:28 Order name: Lipase; Complete Time: 19:28                                                rn  

                                                                                             

18:28 Order name: Blood Culture Adult (2)                                                     rn  

                                                                                             

18:29 Order name: Basic Metabolic Panel; Complete Time: 19:28                                 EDMS

                                                                                             

18:30 Order name: CT Abd/Pelvis - Without Contrast; Complete Time: 19:28                      rn  

                                                                                             

18:28 Order name: EKG; Complete Time: 18:30                                                   rn  

                                                                                             

18:28 Order name: Cardiac monitoring; Complete Time: 18:56                                    rn  

                                                                                             

18:28 Order name: EKG - Nurse/Tech; Complete Time: 18:42                                      rn  

                                                                                             

18:28 Order name: IV Saline Lock; Complete Time: 18:42                                        rn  

                                                                                             

18:28 Order name: Labs collected and sent; Complete Time: 18:42                               rn  

                                                                                             

18:28 Order name: O2 Per Protocol; Complete Time: 18:42                                       rn  

                                                                                             

18:28 Order name: O2 Sat Monitoring; Complete Time: 18:42                                     rn  

                                                                                                  

Administered Medications:                                                                         

21:04 Drug: Zosyn 3.375 grams Route: IVPB; Infused Over: 60 mins; Site: right upper arm;      jd3 

21:38 Follow up: IV Status: Completed infusion; IV Intake: 50ml                               lp1 

21:05 Drug: LevaQUIN 750 mg Volume: 150 ml; Route: IVPB; Infused Over: 90 mins; Site: left    jd3 

      wrist;                                                                                      

22:09 Follow up: IV Status: Infusion continued upon admission                                 lp1 

21:38 Drug: vancoMYCIN 500 mg Route: IVPB; Infused Over: 1 hrs; Site: right upper arm;        lp1 

22:09 Follow up: IV Status: Infusion continued upon admission                                 lp1 

                                                                                                  

                                                                                                  

Disposition:                                                                                      

19 20:21 Hospitalization ordered by Liu Cummins for Inpatient Admission. Preliminary       

  diagnosis is Pneumonia.                                                                         

- Bed requested for Telemetry/MedSurg (Inpatient).                                                

- Status is Inpatient Admission.                                                              aa1 

- Condition is Stable.                                                                            

- Problem is an ongoing problem.                                                                  

- Symptoms have worsened.                                                                         

UTI on Admission? No                                                                              

                                                                                                  

                                                                                                  

                                                                                                  

Signatures:                                                                                       

Dispatcher [employer]                           EDMS                                                 

Tarsha Sheth RN RN                                                      

Jenny Burrell RN RN   aa1                                                  

Cheryl Berrios RN RN                                                      

Torres Gordon MD MD rn Pena, Laura, RN RN   lp1                                                  

Adrian Serrano RN                    RN   jd3                                                  

Chester Love MD MD   ps1                                                  

                                                                                                  

Corrections: (The following items were deleted from the chart)                                    

18:39 18:30 Home Meds: furosemide 40 mg Oral tab 1 tab once daily;   

18:39 18:30 Home Meds: lisinopril 5 mg Oral tab 1 tab once daily;                             

18:39 18:30 Home Meds: Novolin N 100 unit/mL Sub-Q susp twice a day;                          

18:39 18:30 Home Meds: simvastatin 20 mg Oral tab 1 tab once daily;                           

18:39 18:31 Allergies: No Known Allergies; iw                                                 iw  

21:39 20:21 Hospitalization Ordered by Liu Cummins MD for Inpatient Admission. Preliminary    mw  

      diagnosis is Pneumonia. Bed requested for Telemetry/MedSurg (Inpatient). Status is          

      Inpatient Admission. Condition is Stable. Problem is an ongoing problem. Symptoms have      

      worsened. UTI on Admission? No. ps1                                                         

22:28 21:39 2019 20:21 Hospitalization Ordered by Liu Cummins MD for Inpatient          aa1 

      Admission. Preliminary diagnosis is Pneumonia. Bed requested for Telemetry/MedSurg          

      (Inpatient). Status is Inpatient Admission. Condition is Stable. Problem is an ongoing      

      problem. Symptoms have worsened. UTI on Admission? No. mw                                   

                                                                                                  

**************************************************************************************************

## 2019-07-15 LAB — UA DIPSTICK W REFLEX MICRO PNL UR: (no result)

## 2019-07-15 RX ADMIN — CITALOPRAM HYDROBROMIDE SCH MG: 10 TABLET ORAL at 10:01

## 2019-07-15 RX ADMIN — Medication SCH ML: at 21:16

## 2019-07-15 RX ADMIN — ASPIRIN 81 MG SCH MG: 81 TABLET ORAL at 10:00

## 2019-07-15 RX ADMIN — Medication SCH ML: at 08:21

## 2019-07-15 RX ADMIN — TORSEMIDE SCH MG: 20 TABLET ORAL at 10:00

## 2019-07-15 RX ADMIN — GLIMEPIRIDE SCH MG: 2 TABLET ORAL at 16:34

## 2019-07-15 RX ADMIN — METOPROLOL SUCCINATE SCH MG: 25 TABLET, EXTENDED RELEASE ORAL at 10:02

## 2019-07-15 RX ADMIN — HUMAN INSULIN SCH UNIT: 100 INJECTION, SOLUTION SUBCUTANEOUS at 16:34

## 2019-07-15 RX ADMIN — HUMAN INSULIN SCH UNIT: 100 INJECTION, SOLUTION SUBCUTANEOUS at 21:17

## 2019-07-15 RX ADMIN — HYDROCODONE BITARTRATE AND ACETAMINOPHEN SCH TAB: 7.5; 325 TABLET ORAL at 21:16

## 2019-07-15 RX ADMIN — HUMAN INSULIN SCH UNIT: 100 INJECTION, SOLUTION SUBCUTANEOUS at 08:20

## 2019-07-15 RX ADMIN — AZITHROMYCIN SCH MG: 250 TABLET, FILM COATED ORAL at 10:01

## 2019-07-15 RX ADMIN — METOPROLOL SUCCINATE SCH MG: 25 TABLET, EXTENDED RELEASE ORAL at 21:16

## 2019-07-15 RX ADMIN — CLOPIDOGREL BISULFATE SCH MG: 75 TABLET, FILM COATED ORAL at 10:00

## 2019-07-15 RX ADMIN — HUMAN INSULIN SCH UNIT: 100 INJECTION, SOLUTION SUBCUTANEOUS at 11:54

## 2019-07-15 RX ADMIN — HYDROCODONE BITARTRATE AND ACETAMINOPHEN SCH TAB: 7.5; 325 TABLET ORAL at 10:01

## 2019-07-15 NOTE — RAD REPORT
EXAM DESCRIPTION:  RAD - Chest Pa And Lat (2 Views) - 7/15/2019 2:07 pm

 

CLINICAL HISTORY:  Possible pneumonia

Chest pain.

 

COMPARISON:  Chest Single View dated 7/14/2019; Chest Single View dated 4/4/2019; Chest Pa And Lat (2
 Views) dated 3/20/2019; CHEST PA AND LAT 2 VIEW dated 2/18/2010

 

FINDINGS:  The left basilar pneumonia appears slightly improved since the comparative study. The lung
s are otherwise clear. The heart is mildly enlarged in size. No displaced fractures.

 

IMPRESSION:  Mild improvement in left basilar pneumonia.

## 2019-07-15 NOTE — ECHO
HEIGHT: 6 ft 3 in   WEIGHT: 259 lb 1.6 oz   DATE OF STUDY: 07/15/2019   REFER DR: 
Jason Carrillo MD

2-DIMENSIONAL: YES

     M.MODE: YES

 DOPPLER: YES

COLOR FLOW: YES



                    TDS:  NO

PORTABLE: NO

 DEFINITY:  NO

BUBBLE STUDY: NO





DIAGNOSIS:  SHORTNESS OF BREATH



CARDIAC HISTORY:  

CATHERIZATION: YES

SURGERY: NO

PROSTHETIC VALVE: NO

PACEMAKER: NO





MEASUREMENTS (cm)

    DIASTOLIC (NORMALS)                 SYSTOLIC (NORMALS)

IVSd                 1.1 (0.6-1.2)                    LA Diam 3.6 (1.9-4.0)     LVEF       
  46%  

LVIDd               4.1 (3.5-5.7)                        LVIDs      3.1 (2.0-3.5)     %FS  
        23%

LVPWd             1.2 (0.6-1.2)

Ao Diam           3.0 (2.0-3.7)



2 DIMENSIONAL ASSESSMENT:

RIGHT ATRIUM:                   DILATED

LEFT ATRIUM:       DILATED



RIGHT VENTRICLE:            DILATED

LEFT VENTRICLE: NORMAL



TRICUSPID VALVE:             NORMAL

MITRAL VALVE:     NORMAL



PULMONIC VALVE:             NORMAL

AORTIC VALVE:     SCLEROSIS



PERICARDIAL EFFUSION: NONE

AORTIC ROOT:      NORMAL





LEFT VENTRICULAR WALL MOTION:     PARADOXICAL SEPTAL MOTION AS SEEN WITH RIGHT VENTRICLE 
PRESSURE OVERLOAD. 



DOPPLER/COLOR FLOW:     MILD TRICUSPID REGURGITATION. ESTIMATED RIGHT VENTRICULAR SYSTOLIC 
PRESSURE 50 MMHG (MODERATE PULMONARY HYPERTENSION).



COMMENTS:      MILDLY DEPRESSED LEFT VENTRICULAR EJECTION FRACTION WITH PARADOX SEPTAL 
MOTION. DILATED LEFT ATRIUM, RIGHT ATRIUM, AND RIGHT VENTRICLE. AORTIC SCLEROSIS WITH NO 
AORTIC STENOSIS OR AORTIC REGURGITATION. MILD TRICUSPID REGURGITATION. MODERATE PULMONARY 
HYPERTENSION. 



TECHNOLOGIST:   MISSY FRANKS

## 2019-07-15 NOTE — P.CNS
Date of Consult: 07/15/19


Reason for Consult: Possible pneumonia


Chief Complaint: Complaining of lower extremity pain shortness of breath chest 

discomfort


History of Present Illness: 





Patient is 74 years of age a very poor historian currently is been having 

problems for the past 2 weeks has been having some shortness of breath chest 

discomfort main problem seems to be the pain in both his feet patient is a 

diabetic presumed diabetic neuropathy he has never smoked no prior history of 

cardiopulmonary problems does not see any specialist about from his primary 

care doctor not sure if he had some hemoptysis is CT scan of the abdomen did 

show a left lower lobe infiltrate patient has underlying dementia


Allergies





No Known Allergies Allergy (Verified 04/04/19 15:12)


 





Home Medications: 








Aspirin Chewable [Aspirin Chewable*] 81 mg PO DAILY 04/04/19 


Citalopram Hydrobromide [Citalopram HBr] 20 mg PO DAILY 04/04/19 


Clopidogrel Bisulfate [Plavix] 75 mg PO DAILY 04/04/19 


Glimepiride [Amaryl] 4 mg PO BID 04/04/19 


Insulin NPH Human [Novolin N  (Humulin N)*] 30 units SQ DAILY AT SUPPER 04/04/ 19 


Isosorbide Mononitrate [Isosorbide Mononitrate ER] 60 mg PO DAILY PRN 04/04/19 


Metoprolol Succinate [Toprol Xl] 12.5 mg PO BID 04/04/19 


fentaNYL [Duragesic] 25 mcg TD EVERY 3RD DAY 04/04/19 


Acetaminophen/Diphenhydramine [Tylenol Pm Ex-Strength Caplet] 1 tab PO BEDTIME 

07/15/19 


Atorvastatin Calcium [Lipitor] 80 mg PO BEDTIME 07/15/19 


Hydrocodone 7.5/APAP 325 [Norco 7.5/325 mg*] 1 tab PO BID 07/15/19 


Torsemide [Demadex*] 20 mg PO DAILY 07/15/19 








- Past Medical/Surgical History


Diabetic: Yes


-: Hyperlipidemia


-: IDDM


-: HTN


-: Degenerative disc disease


-: Chronic back pain


-: CHF


-: MI


-: STENTs


-: Home O2 3lpm continuous


-: Dementia


-: Angioplasty


-: Bypass in Legs





- Social History


Alcohol use: No


CD- Drugs: No


Caffeine use: Yes


Place of Residence: Home





Review of Systems


General: Weakness


Respiratory: Cough, Shortness of Breath


Cardiovascular: Chest Pain


Musculoskeletal: Leg Pain





Physical Examination














Temp Pulse Resp BP Pulse Ox


 


 97.1 F   81   18   90/56 L  94 


 


 07/15/19 12:00  07/15/19 12:00  07/15/19 12:00  07/15/19 12:00  07/15/19 12:00








General: Alert, Oriented x3


Neck: Supple


Respiratory: Clear to auscultation bilaterally


Cardiovascular: No edema, Regular rate/rhythm


Gastrointestinal: Normal bowel sounds, Soft and benign


Musculoskeletal: No clubbing, No swelling


Integumentary: No rashes, No breakdown


Laboratory Data (last 24 hrs)





07/14/19 18:30: Creatinine 1.46 H


07/14/19 18:30: PT 12.9 H, INR 1.10


07/14/19 18:30: WBC 11.3 H, Hgb 15.7, Hct 48.9, Plt Count 192


07/14/19 18:30: Sodium 137, Potassium 4.6, BUN 28 H, Creatinine 1.51 H, Glucose 

221 H, Total Bilirubin 1.0, AST 20, ALT 25, Alkaline Phosphatase 122 H, Lipase 

101








- Problems


(1) Pneumonia


Current Visit: Yes   Status: Acute   


Plan: 


Patient is 74 years of age with a history of dementia a very poor historian 

admitted with lower extremity pain some chest discomfort cough possible mild 

hemoptysis is never smoked CT scan of the abdomen showed a possible left lower 

lobe infiltrate is white count is mildly elevated patient has chronic renal 

failure vital signs are stable he remains afebrile urinalysis shows trace 

protein and blood cultures are pending is not at risk for resistant infection 

patient received dose of levofloxacin Zosyn and vancomycin I have added 

Zithromax patient's chest x-rays clear


Qualifiers: 


   Pneumonia type: due to unspecified organism 





(2) Neuropathy


Current Visit: Yes   Status: Acute   


Plan: 


I suspect that he has peripheral neuropathy

## 2019-07-16 VITALS — TEMPERATURE: 97.7 F | DIASTOLIC BLOOD PRESSURE: 65 MMHG | SYSTOLIC BLOOD PRESSURE: 105 MMHG

## 2019-07-16 VITALS — OXYGEN SATURATION: 95 %

## 2019-07-16 LAB
COHGB MFR BLDA: 1.8 % (ref 0–1.5)
HDLC SERPL-MCNC: 39 MG/DL (ref 40–60)
LDLC SERPL CALC-MCNC: 48 MG/DL (ref ?–130)
OXYHGB MFR BLDA: 82.2 % (ref 94–97)
SAO2 % BLDA: 84 % (ref 92–98.5)

## 2019-07-16 RX ADMIN — HUMAN INSULIN SCH UNIT: 100 INJECTION, SOLUTION SUBCUTANEOUS at 08:29

## 2019-07-16 RX ADMIN — CLOPIDOGREL BISULFATE SCH MG: 75 TABLET, FILM COATED ORAL at 08:32

## 2019-07-16 RX ADMIN — HUMAN INSULIN SCH UNIT: 100 INJECTION, SOLUTION SUBCUTANEOUS at 16:43

## 2019-07-16 RX ADMIN — METOPROLOL SUCCINATE SCH MG: 25 TABLET, EXTENDED RELEASE ORAL at 08:32

## 2019-07-16 RX ADMIN — CITALOPRAM HYDROBROMIDE SCH MG: 10 TABLET ORAL at 08:32

## 2019-07-16 RX ADMIN — Medication SCH ML: at 08:33

## 2019-07-16 RX ADMIN — HYDROCODONE BITARTRATE AND ACETAMINOPHEN SCH TAB: 7.5; 325 TABLET ORAL at 08:31

## 2019-07-16 RX ADMIN — GLIMEPIRIDE SCH MG: 2 TABLET ORAL at 08:30

## 2019-07-16 RX ADMIN — ASPIRIN 81 MG SCH MG: 81 TABLET ORAL at 08:31

## 2019-07-16 RX ADMIN — TORSEMIDE SCH MG: 20 TABLET ORAL at 08:31

## 2019-07-16 RX ADMIN — AZITHROMYCIN SCH MG: 250 TABLET, FILM COATED ORAL at 08:32

## 2019-07-16 RX ADMIN — GLIMEPIRIDE SCH MG: 2 TABLET ORAL at 16:44

## 2019-07-16 RX ADMIN — HUMAN INSULIN SCH UNIT: 100 INJECTION, SOLUTION SUBCUTANEOUS at 12:19

## 2019-07-16 NOTE — P.PN
Subjective


Date of Service: 07/16/19


Chief Complaint: Pneumonia


Subjective: Improving (Patient seems to be improving is still has some cough 

congestion cultures are so far negative)





Review of Systems


General: Weakness


Respiratory: Cough, Shortness of Breath





Physical Examination





- Vital Signs


Temperature: 97.7 F


Blood Pressure: 118/70


Pulse: 93


Respirations: 16


Pulse Ox (%): 95





- Physical Exam


General: Alert, Oriented x3


HEENT: Atraumatic


Respiratory: Clear to auscultation bilaterally


Cardiovascular: No edema, Regular rate/rhythm





Assessment & Plan





- Problems (Diagnosis)


(1) Pneumonia


Current Visit: Yes   Status: Acute   


Plan: 


Doing better continue change to p.o. doxycycline due to possible interaction of 

Zithromax with citalopram resulting in QT prolongation


Qualifiers: 


   Pneumonia type: due to unspecified organism 





(2) Neuropathy


Current Visit: Yes   Status: Acute   


Plan: 


I suspect that he has peripheral neuropathy








(3) Congestive heart failure (CHF)


Current Visit: Yes   Status: Acute   


Plan: 


Patient has congestive heart failure he does take torsemide at home hypoxic of 

oral room-air ABGs labs reviewed chronic renal failure BNP elevated


Qualifiers: 


   Heart failure type: systolic 


Discharge Plan: Home

## 2019-07-16 NOTE — HP
Date of Admission:  07/14/2019



Chief Complaint:  Multiple include chest pain, abdominal pain, poor general health.



History Of Present Illness:  A 74-year-old male was brought by EMS because of multiple complaints, wh
ich include chest pain, abdominal pain, constipation.  Patient had ER evaluation and he was found to 
have pneumonia.  Patient is admitted.  Patient also was found to have nonsurgical abdominal aneurysm.




Past Medical History:  Extensive includes history of type 2 diabetes; hypertension; severe coronary a
rtery disease, inoperable as per the patient.  He also has chronic pain management issues.  He is on 
fentanyl and hydrocodone.



Family History:  Diabetes present.



Personal History:  Nonsmoker.



Home Medicines:  Please refer to the chart.



Past Surgical History:  Negative.



Review of Systems:

No history of fever, chills, rigors.



Physical Examination:

General:  Revealed a 74-year-old male, not in acute distress. 

Vital Signs:  Normal except for blood pressure of 100 systolic. 

HEENT:  Otherwise negative. 

Neck:  Supple.  JVD negative. 

Chest:  Wheezes bilaterally. 

Heart:  Irregularity noted. 

Abdomen:  No palpable mass.  No focal tenderness.  Bowel sounds decreased. 

Extremities:  No edema.



Laboratory Data:  White count at admission 11.3.  Chem profile; BUN 25, creatinine 1.5, troponin nega
tive.  BNP 5757.



Assessment:  

1.Pneumonia as documented on the chest x-ray.

2.Abdominal pain, constipation, nonsurgical aneurysm.

3.Severe carotid artery disease documented on the CAT during the last admission.

4.Type 2 diabetes, requiring insulin.

5.Hypertension.

6.Hyperlipidemia.



Plan:  Patient is seen by Pulmonary Service.  He is on Zithromax.  Patient has already on fentanyl an
d Lortab ordered.  According to the patient, he was not a candidate for any surgical interventions.  
He just wants pain medication and comfort measures.  Since his blood pressure is low, no additional p
ain medication will be administered.  Patient is restarted on some of his home medications as well as
 insulin sliding scale.





MICHAEL/NAVDEEP

DD:  07/15/2019 20:28:36Voice ID:  [id number]

## 2019-07-17 NOTE — CON
Date of Consultation:  07/16/2019



The patient was admitted to Dr. Cummins's service on 07/14/2019 for pneumonia on the left side.  I saw 
the patient on 07/16/2019.



Reason For Consultation:  New finding of an abdominal aortic aneurysm.



History Of Present Illness:  Mr. Larson is a 74-year-old male, whom I had met before in the office,
 although I have not seen him for years.  He came in with the pneumonia.  He was being treated for th
at.  He also has abdominal pain.  A CT that was done of the abdomen showed a 3.3 wide abdominal aorti
c aneurysm, which is a new finding.  The patient has no symptoms in that regard.  He denied having an
y chest pain.  No shortness of breath, nausea, vomiting, or diaphoresis.  No PND, orthopnea, pedal ed
ema, palpitations, or syncope.  He is feeling good enough after his pneumonia treatment to go home to
day.



Past Medical History:  Include diabetes, hypertension, dyslipidemia, inoperable coronary artery disea
se.



Allergies:  NONE.



Review of Systems:

Negative.



Social History:  Negative.



Family History:  Noncontributory.



Home Medications:  Include Imdur, insulin, Lipitor, aspirin, glimepiride, torsemide, Plavix, and meto
prolol.



Physical Examination:

General:  Mr. Larson was very pleasant, no acute distress. 

Vital Signs:  Stable.  He was in sinus rhythm. 

HEENT:  Negative. 

Neck:  Supple without any lymphadenopathy, JVD, thyromegaly, or bruit. 

Chest:  Clear to auscultation and percussion. 

Cardiac:  Revealed a regular rhythm and rate with S4 gallops.  No murmurs or rubs. 

Abdomen:  Obese, but benign. 

Extremities:  Revealed no clubbing, cyanosis, or edema. 

Skin:  Dry and intact. 

Neurological:  He was nonfocal.  Pulses were present distally bilaterally in the dorsalis pedis and p
osterior tibial.



Diagnostic Data:  He had an echocardiogram showing an ejection fraction of 46% __________ paradoxical
 septum.  Blood work was otherwise unremarkable.



Impression And Plan:  

1.__________ coronary artery disease, fairly asymptomatic.

2.New finding of an abdominal aortic aneurysm of 3.3.  No intervention was needed at this point.  We
 will continue to follow him regarding the aneurysm.  We should have a carotid Doppler and a Lexiscan
 done as an outpatient because of the following risk factors.  I will make arrangements for that.

3.__________.

4.Chronic systolic congestive heart, mild with an ejection fraction of 46%.

5.Diabetes.

6.Dyslipidemia.

7.__________ fairly controlled.

8.Pneumonia __________.





NB/MODL

DD:  07/16/2019 13:40:59Voice ID:  [id number]

DT:  07/16/2019 18:37:17Report ID:  [id number]